# Patient Record
Sex: FEMALE | URBAN - METROPOLITAN AREA
[De-identification: names, ages, dates, MRNs, and addresses within clinical notes are randomized per-mention and may not be internally consistent; named-entity substitution may affect disease eponyms.]

---

## 2022-02-21 ENCOUNTER — HOSPITAL ENCOUNTER (INPATIENT)
Age: 32
LOS: 1 days | Discharge: HOME | End: 2022-02-22
Attending: OBSTETRICS & GYNECOLOGY | Admitting: OBSTETRICS & GYNECOLOGY
Payer: COMMERCIAL

## 2022-02-21 ENCOUNTER — ANESTHESIA EVENT (OUTPATIENT)
Dept: LABOR AND DELIVERY | Age: 32
End: 2022-02-21
Payer: COMMERCIAL

## 2022-02-21 ENCOUNTER — ANESTHESIA (OUTPATIENT)
Dept: LABOR AND DELIVERY | Age: 32
End: 2022-02-21
Payer: COMMERCIAL

## 2022-02-21 PROBLEM — Z34.90 TERM PREGNANCY: Status: ACTIVE | Noted: 2022-02-21

## 2022-02-21 PROBLEM — Z78.9 ADMITTED TO LABOR AND DELIVERY: Status: ACTIVE | Noted: 2022-02-21

## 2022-02-21 PROBLEM — Z78.9 ADMITTED TO LABOR AND DELIVERY: Status: RESOLVED | Noted: 2022-02-21 | Resolved: 2022-02-21

## 2022-02-21 PROBLEM — Z34.90 TERM PREGNANCY: Status: RESOLVED | Noted: 2022-02-21 | Resolved: 2022-02-21

## 2022-02-21 LAB
ABO/RH: NORMAL
ALBUMIN SERPL-MCNC: 3.4 G/DL (ref 3.5–4.6)
ALP BLD-CCNC: 166 U/L (ref 40–130)
ALT SERPL-CCNC: 21 U/L (ref 0–33)
AMPHETAMINE SCREEN, URINE: NORMAL
ANION GAP SERPL CALCULATED.3IONS-SCNC: 16 MEQ/L (ref 9–15)
ANTIBODY SCREEN: NORMAL
AST SERPL-CCNC: 22 U/L (ref 0–35)
BACTERIA: NEGATIVE /HPF
BARBITURATE SCREEN URINE: NORMAL
BASOPHILS ABSOLUTE: 0 K/UL (ref 0–0.2)
BASOPHILS RELATIVE PERCENT: 0.1 %
BENZODIAZEPINE SCREEN, URINE: NORMAL
BILIRUB SERPL-MCNC: 0.3 MG/DL (ref 0.2–0.7)
BILIRUBIN URINE: NEGATIVE
BLOOD, URINE: ABNORMAL
BUN BLDV-MCNC: 8 MG/DL (ref 6–20)
CALCIUM SERPL-MCNC: 9.3 MG/DL (ref 8.5–9.9)
CANNABINOID SCREEN URINE: NORMAL
CHLORIDE BLD-SCNC: 103 MEQ/L (ref 95–107)
CLARITY: ABNORMAL
CO2: 17 MEQ/L (ref 20–31)
COCAINE METABOLITE SCREEN URINE: NORMAL
COLOR: YELLOW
CREAT SERPL-MCNC: 0.6 MG/DL (ref 0.5–0.9)
EOSINOPHILS ABSOLUTE: 0 K/UL (ref 0–0.7)
EOSINOPHILS RELATIVE PERCENT: 0.2 %
EPITHELIAL CELLS, UA: ABNORMAL /HPF (ref 0–5)
GFR AFRICAN AMERICAN: >60
GFR NON-AFRICAN AMERICAN: >60
GLOBULIN: 3.2 G/DL (ref 2.3–3.5)
GLUCOSE BLD-MCNC: 131 MG/DL (ref 70–99)
GLUCOSE URINE: NEGATIVE MG/DL
HCT VFR BLD CALC: 36.1 % (ref 37–47)
HEMOGLOBIN: 12.6 G/DL (ref 12–16)
HEPATITIS B SURFACE ANTIGEN INTERPRETATION: NORMAL
HYALINE CASTS: ABNORMAL /HPF (ref 0–5)
KETONES, URINE: 15 MG/DL
LEUKOCYTE ESTERASE, URINE: NEGATIVE
LYMPHOCYTES ABSOLUTE: 1.9 K/UL (ref 1–4.8)
LYMPHOCYTES RELATIVE PERCENT: 17.3 %
Lab: NORMAL
MCH RBC QN AUTO: 31.3 PG (ref 27–31.3)
MCHC RBC AUTO-ENTMCNC: 34.8 % (ref 33–37)
MCV RBC AUTO: 90.1 FL (ref 82–100)
METHADONE SCREEN, URINE: NORMAL
MONOCYTES ABSOLUTE: 0.6 K/UL (ref 0.2–0.8)
MONOCYTES RELATIVE PERCENT: 5.6 %
NEUTROPHILS ABSOLUTE: 8.4 K/UL (ref 1.4–6.5)
NEUTROPHILS RELATIVE PERCENT: 76.8 %
NITRITE, URINE: NEGATIVE
OPIATE SCREEN URINE: NORMAL
OXYCODONE URINE: NORMAL
PDW BLD-RTO: 13.8 % (ref 11.5–14.5)
PH UA: 7 (ref 5–9)
PHENCYCLIDINE SCREEN URINE: NORMAL
PLATELET # BLD: 226 K/UL (ref 130–400)
POTASSIUM SERPL-SCNC: 3.6 MEQ/L (ref 3.4–4.9)
PROPOXYPHENE SCREEN: NORMAL
PROTEIN UA: NEGATIVE MG/DL
RBC # BLD: 4.01 M/UL (ref 4.2–5.4)
RBC UA: ABNORMAL /HPF (ref 0–5)
RUBELLA ANTIBODY IGG: >500 IU/ML
SARS-COV-2, NAAT: NOT DETECTED
SODIUM BLD-SCNC: 136 MEQ/L (ref 135–144)
SPECIFIC GRAVITY UA: 1.02 (ref 1–1.03)
TOTAL PROTEIN: 6.6 G/DL (ref 6.3–8)
UROBILINOGEN, URINE: 0.2 E.U./DL
WBC # BLD: 11 K/UL (ref 4.8–10.8)
WBC UA: ABNORMAL /HPF (ref 0–5)

## 2022-02-21 PROCEDURE — 87340 HEPATITIS B SURFACE AG IA: CPT

## 2022-02-21 PROCEDURE — 3700000025 EPIDURAL BLOCK: Performed by: NURSE ANESTHETIST, CERTIFIED REGISTERED

## 2022-02-21 PROCEDURE — 2500000003 HC RX 250 WO HCPCS: Performed by: OBSTETRICS & GYNECOLOGY

## 2022-02-21 PROCEDURE — 86850 RBC ANTIBODY SCREEN: CPT

## 2022-02-21 PROCEDURE — 86901 BLOOD TYPING SEROLOGIC RH(D): CPT

## 2022-02-21 PROCEDURE — 86762 RUBELLA ANTIBODY: CPT

## 2022-02-21 PROCEDURE — 80307 DRUG TEST PRSMV CHEM ANLYZR: CPT

## 2022-02-21 PROCEDURE — 1220000000 HC SEMI PRIVATE OB R&B

## 2022-02-21 PROCEDURE — 85025 COMPLETE CBC W/AUTO DIFF WBC: CPT

## 2022-02-21 PROCEDURE — 86592 SYPHILIS TEST NON-TREP QUAL: CPT

## 2022-02-21 PROCEDURE — 6370000000 HC RX 637 (ALT 250 FOR IP): Performed by: OBSTETRICS & GYNECOLOGY

## 2022-02-21 PROCEDURE — 87635 SARS-COV-2 COVID-19 AMP PRB: CPT

## 2022-02-21 PROCEDURE — 6360000002 HC RX W HCPCS: Performed by: NURSE ANESTHETIST, CERTIFIED REGISTERED

## 2022-02-21 PROCEDURE — 2500000003 HC RX 250 WO HCPCS: Performed by: NURSE ANESTHETIST, CERTIFIED REGISTERED

## 2022-02-21 PROCEDURE — 86900 BLOOD TYPING SEROLOGIC ABO: CPT

## 2022-02-21 PROCEDURE — 80053 COMPREHEN METABOLIC PANEL: CPT

## 2022-02-21 PROCEDURE — 2580000003 HC RX 258: Performed by: OBSTETRICS & GYNECOLOGY

## 2022-02-21 PROCEDURE — 6360000002 HC RX W HCPCS: Performed by: OBSTETRICS & GYNECOLOGY

## 2022-02-21 PROCEDURE — 81001 URINALYSIS AUTO W/SCOPE: CPT

## 2022-02-21 RX ORDER — SODIUM CHLORIDE 0.9 % (FLUSH) 0.9 %
5-40 SYRINGE (ML) INJECTION EVERY 12 HOURS SCHEDULED
Status: DISCONTINUED | OUTPATIENT
Start: 2022-02-21 | End: 2022-02-22 | Stop reason: HOSPADM

## 2022-02-21 RX ORDER — ONDANSETRON 2 MG/ML
4 INJECTION INTRAMUSCULAR; INTRAVENOUS EVERY 6 HOURS PRN
Status: DISCONTINUED | OUTPATIENT
Start: 2022-02-21 | End: 2022-02-22 | Stop reason: HOSPADM

## 2022-02-21 RX ORDER — IBUPROFEN 800 MG/1
800 TABLET ORAL EVERY 6 HOURS PRN
Status: DISCONTINUED | OUTPATIENT
Start: 2022-02-21 | End: 2022-02-22 | Stop reason: HOSPADM

## 2022-02-21 RX ORDER — NALBUPHINE HCL 10 MG/ML
10 AMPUL (ML) INJECTION
Status: DISCONTINUED | OUTPATIENT
Start: 2022-02-21 | End: 2022-02-22 | Stop reason: HOSPADM

## 2022-02-21 RX ORDER — SODIUM CHLORIDE, SODIUM LACTATE, POTASSIUM CHLORIDE, CALCIUM CHLORIDE 600; 310; 30; 20 MG/100ML; MG/100ML; MG/100ML; MG/100ML
INJECTION, SOLUTION INTRAVENOUS CONTINUOUS
Status: DISCONTINUED | OUTPATIENT
Start: 2022-02-21 | End: 2022-02-22 | Stop reason: HOSPADM

## 2022-02-21 RX ORDER — SODIUM CHLORIDE 9 MG/ML
25 INJECTION, SOLUTION INTRAVENOUS PRN
Status: DISCONTINUED | OUTPATIENT
Start: 2022-02-21 | End: 2022-02-22 | Stop reason: HOSPADM

## 2022-02-21 RX ORDER — DOCUSATE SODIUM 100 MG/1
100 CAPSULE, LIQUID FILLED ORAL 2 TIMES DAILY
Status: DISCONTINUED | OUTPATIENT
Start: 2022-02-21 | End: 2022-02-22 | Stop reason: HOSPADM

## 2022-02-21 RX ORDER — NALBUPHINE HCL 10 MG/ML
5 AMPUL (ML) INJECTION EVERY 4 HOURS PRN
Status: DISCONTINUED | OUTPATIENT
Start: 2022-02-21 | End: 2022-02-22 | Stop reason: HOSPADM

## 2022-02-21 RX ORDER — IBUPROFEN 800 MG/1
800 TABLET ORAL EVERY 6 HOURS PRN
Qty: 120 TABLET | Refills: 3 | Status: SHIPPED | OUTPATIENT
Start: 2022-02-21

## 2022-02-21 RX ORDER — SODIUM CHLORIDE 0.9 % (FLUSH) 0.9 %
5-40 SYRINGE (ML) INJECTION PRN
Status: DISCONTINUED | OUTPATIENT
Start: 2022-02-21 | End: 2022-02-22 | Stop reason: HOSPADM

## 2022-02-21 RX ORDER — FENTANYL CITRATE 50 UG/ML
INJECTION, SOLUTION INTRAMUSCULAR; INTRAVENOUS PRN
Status: DISCONTINUED | OUTPATIENT
Start: 2022-02-21 | End: 2022-02-21 | Stop reason: SDUPTHER

## 2022-02-21 RX ORDER — NALOXONE HYDROCHLORIDE 0.4 MG/ML
0.4 INJECTION, SOLUTION INTRAMUSCULAR; INTRAVENOUS; SUBCUTANEOUS PRN
Status: DISCONTINUED | OUTPATIENT
Start: 2022-02-21 | End: 2022-02-22 | Stop reason: HOSPADM

## 2022-02-21 RX ORDER — MODIFIED LANOLIN
OINTMENT (GRAM) TOPICAL PRN
Status: DISCONTINUED | OUTPATIENT
Start: 2022-02-21 | End: 2022-02-22 | Stop reason: HOSPADM

## 2022-02-21 RX ORDER — BUPIVACAINE HYDROCHLORIDE 2.5 MG/ML
INJECTION, SOLUTION EPIDURAL; INFILTRATION; INTRACAUDAL PRN
Status: DISCONTINUED | OUTPATIENT
Start: 2022-02-21 | End: 2022-02-21 | Stop reason: SDUPTHER

## 2022-02-21 RX ORDER — ACETAMINOPHEN 325 MG/1
650 TABLET ORAL EVERY 4 HOURS PRN
Status: DISCONTINUED | OUTPATIENT
Start: 2022-02-21 | End: 2022-02-22 | Stop reason: HOSPADM

## 2022-02-21 RX ADMIN — IBUPROFEN 800 MG: 800 TABLET, FILM COATED ORAL at 20:28

## 2022-02-21 RX ADMIN — SODIUM CHLORIDE, POTASSIUM CHLORIDE, SODIUM LACTATE AND CALCIUM CHLORIDE: 600; 310; 30; 20 INJECTION, SOLUTION INTRAVENOUS at 10:33

## 2022-02-21 RX ADMIN — SODIUM CHLORIDE, POTASSIUM CHLORIDE, SODIUM LACTATE AND CALCIUM CHLORIDE: 600; 310; 30; 20 INJECTION, SOLUTION INTRAVENOUS at 08:02

## 2022-02-21 RX ADMIN — Medication 12 ML/HR: at 08:20

## 2022-02-21 RX ADMIN — Medication 3 ML: at 08:15

## 2022-02-21 RX ADMIN — DOCUSATE SODIUM 100 MG: 100 CAPSULE ORAL at 20:28

## 2022-02-21 RX ADMIN — NALBUPHINE HYDROCHLORIDE 10 MG: 10 INJECTION, SOLUTION INTRAMUSCULAR; INTRAVENOUS; SUBCUTANEOUS at 05:52

## 2022-02-21 RX ADMIN — FENTANYL CITRATE 100 MCG: 50 INJECTION, SOLUTION INTRAMUSCULAR; INTRAVENOUS at 09:45

## 2022-02-21 RX ADMIN — SODIUM CHLORIDE, POTASSIUM CHLORIDE, SODIUM LACTATE AND CALCIUM CHLORIDE: 600; 310; 30; 20 INJECTION, SOLUTION INTRAVENOUS at 06:33

## 2022-02-21 RX ADMIN — BUPIVACAINE HYDROCHLORIDE 3 ML: 2.5 INJECTION, SOLUTION EPIDURAL; INFILTRATION; INTRACAUDAL; PERINEURAL at 09:45

## 2022-02-21 ASSESSMENT — PAIN SCALES - GENERAL
PAINLEVEL_OUTOF10: 8
PAINLEVEL_OUTOF10: 8
PAINLEVEL_OUTOF10: 1

## 2022-02-21 NOTE — FLOWSHEET NOTE
0800 Sitting for Epidural Annemarie CRNA at bedside. 0810 Test dose given  0816 Epidural complete pt lying down with l sided wedge  0820 Epidural to pump rate set at 12.

## 2022-02-21 NOTE — FLOWSHEET NOTE
Epidural capped pt up to bathroom gisselle care done unable to void. IV converted to hep lock fluids infused.

## 2022-02-21 NOTE — L&D DELIVERY NOTE
Department of Obstetrics and Gynecology  Spontaneous Vaginal Delivery Note      Pre-operative Diagnosis:  Term pregnancy    Post-operative Diagnosis:  Living  infant(s)    Information for the patient's :  Anaya Benson [[id number]]                    Infant Wt:   Information for the patient's :  Anaya Benson [[id number]]           APGARS:   8/9  Information for the patient's :  Anaya Benson [[id number]]           Anesthesia:  epidural anesthesia    Application and Delivery:  none    Delivery Summary:EBL 18cc  NCVD of a female infant. Oral and nasal orifice suctioned. Remainder of the delivery was unremarkable. Cord was clamped and cut. The placenta was manually removed. Perineum was intact. Mother and baby bonded well. Specimen:  Placenta sent to pathology     Intake/Output:     Date 22 07 - 22 0700(Not Admitted) 22 0701 - 22 0700   Shift 9934-1319 7312-8933 24 Hour Total 1371-1092 0730-1372 24 Hour Total   INTAKE   I.V.    1484.9  1484.9   Shift Total    1484.9  1484.9   OUTPUT   Blood    200  200     Quantitative Blood Loss (mL)    200  200   Shift Total    200  200   NET    1284.9  1284.9       Condition:  infant stable to general nursery    Blood Type and Rh: A POS        Rubella Immunity Status:   Immune           Infant Feeding:    breast    Attending Attestation: I was present and scrubbed for the entire procedure.

## 2022-02-21 NOTE — PLAN OF CARE
Problem: Pain:  Goal: Pain level will decrease  Description: Pain level will decrease  2/21/2022 1107 by Jerman Sheikh RN  Outcome: Met This Shift  2/21/2022 0740 by Jerman Sheikh RN  Outcome: Ongoing  Goal: Control of acute pain  Description: Control of acute pain  2/21/2022 1107 by Jerman Sheikh RN  Outcome: Met This Shift  2/21/2022 0740 by Jerman Sheikh RN  Outcome: Ongoing  Goal: Control of chronic pain  Description: Control of chronic pain  2/21/2022 1107 by Jerman Sheikh RN  Outcome: Met This Shift  2/21/2022 0740 by Jerman Sheikh RN  Outcome: Ongoing     Problem: Anxiety:  Goal: Level of anxiety will decrease  Description: Level of anxiety will decrease  2/21/2022 1107 by Jerman Sheikh RN  Outcome: Met This Shift  2/21/2022 0740 by Jerman Sheikh RN  Outcome: Ongoing     Problem: Breathing Pattern - Ineffective:  Goal: Able to breathe comfortably  Description: Able to breathe comfortably  2/21/2022 1107 by Jreman Sheikh RN  Outcome: Met This Shift  2/21/2022 0740 by Jerman Sheikh RN  Outcome: Ongoing     Problem: Fluid Volume - Imbalance:  Goal: Absence of imbalanced fluid volume signs and symptoms  Description: Absence of imbalanced fluid volume signs and symptoms  2/21/2022 1107 by Jerman Sheikh RN  Outcome: Met This Shift  2/21/2022 0740 by Jerman Sheikh RN  Outcome: Ongoing  Goal: Absence of intrapartum hemorrhage signs and symptoms  Description: Absence of intrapartum hemorrhage signs and symptoms  2/21/2022 1107 by Jerman Sheikh RN  Outcome: Met This Shift  2/21/2022 0740 by Jerman Sheikh RN  Outcome: Ongoing     Problem: Infection - Intrapartum Infection:  Goal: Will show no infection signs and symptoms  Description: Will show no infection signs and symptoms  2/21/2022 1107 by Jerman Sheikh RN  Outcome: Met This Shift  2/21/2022 0740 by Jerman Sheikh RN  Outcome: Ongoing     Problem: Labor Process - Prolonged:  Goal: Labor progression, first stage, within specified pattern  Description: Labor progression, first stage, within specified pattern  2022 1107 by Ben Brumfield RN  Outcome: Met This Shift  2022 0740 by Ben Brumfield RN  Outcome: Ongoing  Goal: Labor progession, second stage, within specified pattern  Description: Labor progession, second stage, within specified pattern  2022 1107 by Ben Brumfield RN  Outcome: Met This Shift  2022 0740 by Ben Brumfield RN  Outcome: Ongoing  Goal: Uterine contractions within specified parameters  Description: Uterine contractions within specified parameters  2022 1107 by Ben Brumfield RN  Outcome: Met This Shift  2022 0740 by Ben Brumfield RN  Outcome: Ongoing     Problem:  Screening:  Goal: Ability to make informed decisions regarding treatment has improved  Description: Ability to make informed decisions regarding treatment has improved  2022 1107 by Ben Brumfield RN  Outcome: Met This Shift  2022 0740 by Ben Brumfield RN  Outcome: Ongoing     Problem: Pain - Acute:  Goal: Pain level will decrease  Description: Pain level will decrease  2022 1107 by Ben Brumfield RN  Outcome: Met This Shift  2022 0740 by Ben Brumfield RN  Outcome: Ongoing  Goal: Able to cope with pain  Description: Able to cope with pain  2022 1107 by Ben Brumfield RN  Outcome: Met This Shift  2022 0740 by Ben Brumfield RN  Outcome: Ongoing     Problem: Tissue Perfusion - Uteroplacental, Altered:  Goal: Absence of abnormal fetal heart rate pattern  Description: Absence of abnormal fetal heart rate pattern  2022 1107 by Ben Brumfield RN  Outcome: Met This Shift  2022 0740 by Ben Brumfield RN  Outcome: Ongoing     Problem: Urinary Retention:  Goal: Experiences of bladder distention will decrease  Description: Experiences of bladder distention will decrease  2022 1107 by Ben Brumfield RN  Outcome: Met This Shift  2/21/2022 0740 by Gera Peralta RN  Outcome: Ongoing  Goal: Urinary elimination within specified parameters  Description: Urinary elimination within specified parameters  2/21/2022 1107 by Gera Peralta RN  Outcome: Met This Shift  2/21/2022 0740 by Gera Peralta RN  Outcome: Ongoing

## 2022-02-21 NOTE — FLOWSHEET NOTE
Received call from Dr Ardie Snellen MD in route pt aware.  Resting comfortably awaiting MD's arrival.

## 2022-02-21 NOTE — PLAN OF CARE
Problem: Pain:  Goal: Pain level will decrease  Description: Pain level will decrease  2/21/2022 1107 by Mami Menjivar RN  Outcome: Ongoing  2/21/2022 1107 by Mami Menjivar RN  Outcome: Met This Shift  2/21/2022 0740 by Mami Menjivar RN  Outcome: Ongoing  Goal: Control of acute pain  Description: Control of acute pain  2/21/2022 1107 by Mami Menjivar, RN  Outcome: Ongoing  2/21/2022 1107 by Mami Menjivar RN  Outcome: Met This Shift  2/21/2022 0740 by Mami Menjivar RN  Outcome: Ongoing  Goal: Control of chronic pain  Description: Control of chronic pain  2/21/2022 1107 by Mami Menjivar, RN  Outcome: Ongoing  2/21/2022 1107 by Mami Menjivar RN  Outcome: Met This Shift  2/21/2022 0740 by Mami Menjivar RN  Outcome: Ongoing     Problem: Anxiety:  Goal: Level of anxiety will decrease  Description: Level of anxiety will decrease  2/21/2022 1107 by Mami Menjivar, RN  Outcome: Ongoing  2/21/2022 1107 by Mami Menjivar, RN  Outcome: Met This Shift  2/21/2022 0740 by Mami Menjivar RN  Outcome: Ongoing     Problem: Breathing Pattern - Ineffective:  Goal: Able to breathe comfortably  Description: Able to breathe comfortably  2/21/2022 1107 by Mami Menjivar RN  Outcome: Ongoing  2/21/2022 1107 by Mami Menjivar RN  Outcome: Met This Shift  2/21/2022 0740 by Mami Menjivar RN  Outcome: Ongoing     Problem: Fluid Volume - Imbalance:  Goal: Absence of imbalanced fluid volume signs and symptoms  Description: Absence of imbalanced fluid volume signs and symptoms  2/21/2022 1107 by Mami Menjivar RN  Outcome: Ongoing  2/21/2022 1107 by Mami Menjivar, RN  Outcome: Met This Shift  2/21/2022 0740 by Mami Menjivar RN  Outcome: Ongoing  Goal: Absence of intrapartum hemorrhage signs and symptoms  Description: Absence of intrapartum hemorrhage signs and symptoms  2/21/2022 1107 by Mami Menjivar RN  Outcome: Ongoing  2/21/2022 1107 by Mami Menjivar RN  Outcome: Met This Shift  2022 0740 by Ean Zapata RN  Outcome: Ongoing  Goal: Absence of postpartum hemorrhage signs and symptoms  Description: Absence of postpartum hemorrhage signs and symptoms  Outcome: Ongoing     Problem: Infection - Intrapartum Infection:  Goal: Will show no infection signs and symptoms  Description: Will show no infection signs and symptoms  2022 110 by Ean Zapata RN  Outcome: Ongoing  2022 110 by Ean Zapata RN  Outcome: Met This Shift  2022 0740 by Ean Zapata RN  Outcome: Ongoing     Problem: Labor Process - Prolonged:  Goal: Labor progression, first stage, within specified pattern  Description: Labor progression, first stage, within specified pattern  2022 110 by Ean Zapata RN  Outcome: Ongoing  2022 110 by Ean Zapata RN  Outcome: Met This Shift  2022 0740 by Ean Zapata RN  Outcome: Ongoing  Goal: Labor progession, second stage, within specified pattern  Description: Labor progession, second stage, within specified pattern  2022 110 by Ean Zapata RN  Outcome: Ongoing  2022 110 by Ean Zapata RN  Outcome: Met This Shift  2022 0740 by Ean Zapata RN  Outcome: Ongoing  Goal: Uterine contractions within specified parameters  Description: Uterine contractions within specified parameters  2022 by Ean Zapata RN  Outcome: Ongoing  2022 1107 by Ean Zapata RN  Outcome: Met This Shift  2022 0740 by Ean Zapata RN  Outcome: Ongoing     Problem:  Screening:  Goal: Ability to make informed decisions regarding treatment has improved  Description: Ability to make informed decisions regarding treatment has improved  2022 1107 by Ean Zapata RN  Outcome: Ongoing  2022 110 by Ean Zapata RN  Outcome: Met This Shift  2022 0740 by Ean Zapata RN  Outcome: Ongoing     Problem: Pain - Acute:  Goal: Pain level will decrease  Description: Pain level will decrease  2/21/2022 1107 by Pura Harvey RN  Outcome: Ongoing  2/21/2022 1107 by Pura Harvey RN  Outcome: Met This Shift  2/21/2022 0740 by Pura Harvey RN  Outcome: Ongoing  Goal: Able to cope with pain  Description: Able to cope with pain  2/21/2022 1107 by Pura Harvey RN  Outcome: Ongoing  2/21/2022 1107 by Pura Harvey RN  Outcome: Met This Shift  2/21/2022 0740 by Pura Harvey RN  Outcome: Ongoing     Problem: Tissue Perfusion - Uteroplacental, Altered:  Goal: Absence of abnormal fetal heart rate pattern  Description: Absence of abnormal fetal heart rate pattern  2/21/2022 1107 by Pura Harvey RN  Outcome: Ongoing  2/21/2022 1107 by Pura Harvey RN  Outcome: Met This Shift  2/21/2022 0740 by Pura Harvey RN  Outcome: Ongoing     Problem: Urinary Retention:  Goal: Experiences of bladder distention will decrease  Description: Experiences of bladder distention will decrease  2/21/2022 1107 by Pura Harvey RN  Outcome: Ongoing  2/21/2022 1107 by Pura Harvey RN  Outcome: Met This Shift  2/21/2022 0740 by Pura Harvey RN  Outcome: Ongoing  Goal: Urinary elimination within specified parameters  Description: Urinary elimination within specified parameters  2/21/2022 1107 by Pura Harvey RN  Outcome: Ongoing  2/21/2022 1107 by Pura Harvey RN  Outcome: Met This Shift  2/21/2022 0740 by Pura Harvey RN  Outcome: Ongoing     Problem: Discharge Planning:  Goal: Discharged to appropriate level of care  Description: Discharged to appropriate level of care  Outcome: Ongoing     Problem: Constipation:  Goal: Bowel elimination is within specified parameters  Description: Bowel elimination is within specified parameters  Outcome: Ongoing     Problem: Infection - Risk of, Puerperal Infection:  Goal: Will show no infection signs and symptoms  Description: Will show no infection signs and symptoms  2/21/2022 1107 by Gary Mejia RN  Outcome: Ongoing  2/21/2022 1107 by Gary Mejia RN  Outcome: Met This Shift  2/21/2022 0740 by Gary Mejia RN  Outcome: Ongoing     Problem: Mood - Altered:  Goal: Mood stable  Description: Mood stable  Outcome: Ongoing

## 2022-02-21 NOTE — LACTATION NOTE
This note was copied from a baby's chart.  -initial lactation visit  -mom has prior breastfeeding experience x 1 year  -states she has breastpump for home use  -baby skin to skin with mom, noted to be mucousy and spitting small amounts of amniotic fluid (precip vag delivery)  -assisted in latching baby to left breast in cradle hold  -on/off latch noted with a few short bursts of rhythmic sucking  -educated on normal infant feeding patterns, anticipated output and weight monitoring  -provided reassurance and encouragement  -advised to call for next feed  -will revisit    1225-follow up  -mom reports breastfeeding going \"ok\"  -states baby fed off and on for approx 1 hr post delivery (did not request [facility] assistance)  -on cell phone at time of visit  -encouraged to call for next feed    1510-follow up  -mom states baby has been sleeping since last feed  -recommend placing baby skin to skin and observing for feeding cues  -mom verbalized understanding  -denies questions/concerns re: breastfeeding at this time

## 2022-02-21 NOTE — FLOWSHEET NOTE
Iraheta inserted chux with moderate amount of clear fluid. SVE pt 9cms. Call placed to Dr Ardie Snellen. On his way.

## 2022-02-21 NOTE — FLOWSHEET NOTE
Call to Dr. Mario Smith. Informed of pts cervical change. Admit labor orders received.  Stated okay for pt to have epidural.

## 2022-02-21 NOTE — ANESTHESIA PRE PROCEDURE
Department of Anesthesiology  Preprocedure Note       Name:  Shruthi Vieyra   Age:  28 y.o.  :  1990                                          MRN:  [id number]         Date:  2022      Surgeon: * No surgeons listed *    Procedure: * No procedures listed *    Medications prior to admission:   Prior to Admission medications    Not on File       Current medications:    Current Facility-Administered Medications   Medication Dose Route Frequency Provider Last Rate Last Admin    nalbuphine (NUBAIN) injection 10 mg  10 mg IntraVENous Q3H PRN Carter Cruz MD   10 mg at 22 6382    lactated ringers infusion   IntraVENous Continuous Carter Cruz  mL/hr at 22 6736 New Bag at 22 8224       Allergies:  No Known Allergies    Problem List:  There is no problem list on file for this patient. Past Medical History:  History reviewed. No pertinent past medical history. Past Surgical History:        Procedure Laterality Date     SECTION      WISDOM TOOTH EXTRACTION         Social History:    Social History     Tobacco Use    Smoking status: Never Smoker    Smokeless tobacco: Never Used   Substance Use Topics    Alcohol use: Not on file                                Counseling given: Not Answered      Vital Signs (Current):   Vitals:    22 0504 22 0505 22 0536   BP: 125/74 (!) 143/75    Pulse: 85 78    Resp: 18     Temp: 36.9 °C (98.5 °F)     TempSrc: Oral     SpO2: 99%     Weight:   225 lb (102.1 kg)   Height:   5' 3\" (1.6 m)                                              BP Readings from Last 3 Encounters:   22 (!) 143/75       NPO Status:                                                                                 BMI:   Wt Readings from Last 3 Encounters:   22 225 lb (102.1 kg)     Body mass index is 39.86 kg/m².     CBC:   Lab Results   Component Value Date    WBC 11.0 2022    RBC 4.01 2022    HGB 12.6 2022    HCT 36.1 02/21/2022    MCV 90.1 02/21/2022    RDW 13.8 02/21/2022     02/21/2022       CMP:   Lab Results   Component Value Date     02/21/2022    K 3.6 02/21/2022     02/21/2022    CO2 17 02/21/2022    BUN 8 02/21/2022    CREATININE 0.60 02/21/2022    GFRAA >60.0 02/21/2022    LABGLOM >60.0 02/21/2022    GLUCOSE 131 02/21/2022    PROT 6.6 02/21/2022    CALCIUM 9.3 02/21/2022    BILITOT 0.3 02/21/2022    ALKPHOS 166 02/21/2022    AST 22 02/21/2022    ALT 21 02/21/2022       POC Tests: No results for input(s): POCGLU, POCNA, POCK, POCCL, POCBUN, POCHEMO, POCHCT in the last 72 hours. Coags: No results found for: PROTIME, INR, APTT    HCG (If Applicable): No results found for: PREGTESTUR, PREGSERUM, HCG, HCGQUANT     ABGs: No results found for: PHART, PO2ART, TXB0ZFM, DEE1JZM, BEART, R9GSWHDJ     Type & Screen (If Applicable):  No results found for: LABABO, LABRH    Drug/Infectious Status (If Applicable):  No results found for: HIV, HEPCAB    COVID-19 Screening (If Applicable):   Lab Results   Component Value Date    COVID19 Not Detected 02/21/2022           Anesthesia Evaluation  Patient summary reviewed and Nursing notes reviewed  Airway: Mallampati: II  TM distance: >3 FB   Neck ROM: full  Mouth opening: > = 3 FB Dental:          Pulmonary:                              Cardiovascular:                      Neuro/Psych:               GI/Hepatic/Renal:             Endo/Other:                     Abdominal:             Vascular: Other Findings:             Anesthesia Plan      epidural     ASA 2             Anesthetic plan and risks discussed with patient. Use of blood products discussed with patient whom consented to blood products.                    GONZALO Valle - SOUMYA   2/21/2022

## 2022-02-21 NOTE — FLOWSHEET NOTE
Received report at bedside pt uncomfortable fluids increasing as bolus. Prefers to wait until comfortable for assessment of cervix. No fluid noted on cgux.

## 2022-02-21 NOTE — ANESTHESIA PROCEDURE NOTES
Epidural Block    Patient location during procedure: OB  Start time: 2/21/2022 8:05 AM  End time: 2/21/2022 8:11 AM  Reason for block: labor epidural  Staffing  Performed: resident/CRNA   Resident/CRNA: GONZALO Aguayo CRNA  Preanesthetic Checklist  Completed: patient identified, IV checked, risks and benefits discussed, monitors and equipment checked, pre-op evaluation, timeout performed, anesthesia consent given, oxygen available and patient being monitored  Epidural  Patient position: sitting  Prep: ChloraPrep and site prepped and draped  Patient monitoring: frequent blood pressure checks and continuous pulse ox  Approach: midline  Location: lumbar (1-5)  Injection technique: VINOD saline  Provider prep: sterile gloves and mask  Needle  Needle type: Tuohy   Needle gauge: 17 G  Needle length: 3.5 in  Needle insertion depth: 7.5 cm  Catheter type: end hole  Catheter size: 19 G  Catheter at skin depth: 14 cm  Test dose: negative (@0810 4 ml 1.5% lido w 1:200 k epi)  Assessment  Sensory level: T6  Hemodynamics: stable  Attempts: 1  Additional Notes  Negative heme, negative CSF to catheter aspiration.  Negative parasthesia

## 2022-02-21 NOTE — ANESTHESIA POSTPROCEDURE EVALUATION
Department of Anesthesiology  Postprocedure Note    Patient: Baldo Langford  MRN: [id number]  YOB: 1990  Date of evaluation: 2/21/2022  Time:  2:59 PM     Procedure Summary     Date: 02/21/22 Room / Location:     Anesthesia Start: 0755 Anesthesia Stop: 3582    Procedure: Labor Analgesia Diagnosis:     Scheduled Providers:  Responsible Provider: GONZALO Sheth CRNA    Anesthesia Type: epidural ASA Status: 2          Anesthesia Type: epidural    Sharon Phase I: Sharon Score: 10    Sharon Phase II:      Last vitals: Reviewed and per EMR flowsheets.        Anesthesia Post Evaluation    Patient location during evaluation: bedside  Patient participation: complete - patient participated  Level of consciousness: awake and alert  Pain score: 0  Airway patency: patent  Nausea & Vomiting: no nausea and no vomiting  Complications: no  Cardiovascular status: blood pressure returned to baseline and hemodynamically stable  Respiratory status: acceptable, spontaneous ventilation, nonlabored ventilation and room air  Hydration status: euvolemic

## 2022-02-21 NOTE — FLOWSHEET NOTE
Type and Screen verification drawn and sent to lab. Received call from Dr Kathrin Arnold made aware of current pt status will call MD after next evaluation with and update.

## 2022-02-21 NOTE — H&P
Department of Obstetrics and Gynecology  Attending Obstetrics History and Physical        CHIEF COMPLAINT:  contractions    HISTORY OF PRESENT ILLNESS:      The patient is a 28 y.o.  3 parity 2 at 42 weeks. Patient presents with a chief complaint as above and is being admitted for latent labor    DATES:    Last Menstrual Period:    Estimated Due Date:      PRENATAL CARE:    Provider:  vannesa    Blood Type/Rh:    Antibody Screen:    Rubella:    RPR:    Hepatitis B Surface Antigen:   HIV:    Gonorrhea:    Chlamydia:    MSAFP/Multiple Markers:  Date:  ; Results:    U/S Structural Survery:    1 hour Glucose Tolerance Test:    Group B Strep:        PAST OB HISTORY        Depression:  No      Post-partum depression:  No      Diabetes:  No      Gestational Diabetes:  No      Thyroid Disease:  No      Chronic HTN:  No      Gestation HTN:  No      Pre-eclampsia:  No      Seizure disorder:  No      Asthma:  No      Clotting disorder:  No      :  No      Tubal ligation:  No      D & C:  No      Cerclage:  No      LEEP:  No      Myomectomy:  No    OB History    Para Term  AB Living   3 2 1 1   2   SAB IAB Ectopic Molar Multiple Live Births                    # Outcome Date GA Lbr Denny/2nd Weight Sex Delivery Anes PTL Lv   3 Current            2  19 34w0d   M       1 Term 09    F Vag-Breech        Past Gynecological History:      Menarche:  13  Last menstrual period:  No LMP recorded. Patient is pregnant. History of uterine fibroids:  No  History of endometriosis:  No  Pap History:  Last PAP was normal; 2021. Sexually transmitted disease history: none    Past Medical History:    History reviewed. No pertinent past medical history. Past Surgical History:        Procedure Laterality Date     SECTION      WISDOM TOOTH EXTRACTION       Social History:      Family History:   History reviewed. No pertinent family history.   Medications Prior to Admission:  No medications prior to admission. Allergies:  Patient has no known allergies. REVIEW OF SYSTEMS:    Patient has no history of depression. Patient has no symptoms of depression  CONSTITUTIONAL:  negative    PHYSICAL EXAM:    General appearance:  awake, alert, cooperative, no apparent distress, and appears stated age  Neurologic:  Awake, alert, oriented to name, place and time. Cranial nerves II-XII are grossly intact. Motor is 5 out of 5 bilaterally. Cerebellar finger to nose, heel to shin intact. Sensory is intact. Babinski down going, Romberg negative, and gait is normal.  Lungs:  No increased work of breathing, good air exchange, clear to auscultation bilaterally, no crackles or wheezing  Heart:  Normal apical impulse, regular rate and rhythm, normal S1 and S2, no S3 or S4, and no murmur noted  Abdomen:  No scars, normal bowel sounds, soft, non-distended, non-tender, no masses palpated, no hepatosplenomegally  Fetal heart rate:  Baseline Heart Rate 140, accelerations:  present  Pelvis:  External Genitalia: General appearance; normal, Hair distribution; normal, Lesions absent  Cervix:    DILATION:  3 cm  EFFACEMENT:   75%  STATION:  -3 cm  CONSISTENCY:  medium  POSITION:  mid  BISHOPS SCORE:      Contraction frequency:  6 minutes  Membranes:  Ruptured clear fluid    Pelvic Ultrasound:      General Labs:  VDRL:  No components found for: CVDRL    ASSESSMENT AND PLAN:    The patient is a 28 y.o.  3 parity 2 at 40 weeks    Principal Problem:    Admitted to labor and delivery  Plan:    Active Problems:    Term pregnancy  Plan:

## 2022-02-21 NOTE — PROGRESS NOTES
Griffin Antis placed call to Dr Brendolyn Saint. States at this time we will admit the patient for observation.

## 2022-02-21 NOTE — PROGRESS NOTES
Pt up to unit via wheelchair with . States SROM at 2/21 0300 with clear fluid. No bleeding. Complains of contractions rating the pain 8/10. EFM monitors applied. Pt states she does not feel that she can give a urine sample at this time. Will collect urine at a later time.

## 2022-02-22 VITALS
WEIGHT: 225 LBS | SYSTOLIC BLOOD PRESSURE: 106 MMHG | HEART RATE: 92 BPM | TEMPERATURE: 98.1 F | DIASTOLIC BLOOD PRESSURE: 70 MMHG | RESPIRATION RATE: 16 BRPM | BODY MASS INDEX: 39.87 KG/M2 | HEIGHT: 63 IN | OXYGEN SATURATION: 96 %

## 2022-02-22 LAB — RPR: NORMAL

## 2022-02-22 PROCEDURE — 6370000000 HC RX 637 (ALT 250 FOR IP): Performed by: OBSTETRICS & GYNECOLOGY

## 2022-02-22 PROCEDURE — 10D17Z9 MANUAL EXTRACTION OF PRODUCTS OF CONCEPTION, RETAINED, VIA NATURAL OR ARTIFICIAL OPENING: ICD-10-PCS | Performed by: OBSTETRICS & GYNECOLOGY

## 2022-02-22 PROCEDURE — 7200000001 HC VAGINAL DELIVERY

## 2022-02-22 RX ADMIN — IBUPROFEN 800 MG: 800 TABLET, FILM COATED ORAL at 09:53

## 2022-02-22 RX ADMIN — DOCUSATE SODIUM 100 MG: 100 CAPSULE ORAL at 09:53

## 2022-02-22 ASSESSMENT — PAIN SCALES - GENERAL: PAINLEVEL_OUTOF10: 2

## 2022-02-22 NOTE — PLAN OF CARE
Problem: Pain:  Description: Pain management should include both nonpharmacologic and pharmacologic interventions. Goal: Pain level will decrease  Description: Pain level will decrease  2/21/2022 2010 by Jena Shi RN  Outcome: Ongoing  2/21/2022 1107 by Gera Peralta RN  Outcome: Ongoing  2/21/2022 1107 by Gera Peralta RN  Outcome: Met This Shift  2/21/2022 0740 by Gera Peralta RN  Outcome: Ongoing  Goal: Control of acute pain  Description: Control of acute pain  2/21/2022 2010 by Jena Shi RN  Outcome: Met This Shift  2/21/2022 1107 by Gera Peralta RN  Outcome: Ongoing  2/21/2022 1107 by Gera Peralta RN  Outcome: Met This Shift  2/21/2022 0740 by Gera Peralta RN  Outcome: Ongoing  Goal: Control of chronic pain  Description: Control of chronic pain  2/21/2022 2010 by Jena Shi RN  Outcome: Met This Shift  2/21/2022 1107 by Gera Peralta RN  Outcome: Ongoing  2/21/2022 1107 by Gera Peralta RN  Outcome: Met This Shift  2/21/2022 0740 by Gera Peralta RN  Outcome: Ongoing     Problem: Pain:  Description: Pain management should include both nonpharmacologic and pharmacologic interventions. Goal: Control of acute pain  Description: Control of acute pain  2/21/2022 2010 by Jena Shi RN  Outcome: Met This Shift  2/21/2022 1107 by Gera Peralta RN  Outcome: Ongoing  2/21/2022 1107 by Gera Peralta RN  Outcome: Met This Shift  2/21/2022 0740 by Gera Peralta RN  Outcome: Ongoing     Problem: Pain:  Description: Pain management should include both nonpharmacologic and pharmacologic interventions.   Goal: Control of chronic pain  Description: Control of chronic pain  2/21/2022 2010 by Jena Shi RN  Outcome: Met This Shift  2/21/2022 1107 by Gera Peralta RN  Outcome: Ongoing  2/21/2022 1107 by Gera Peralta RN  Outcome: Met This Shift  2/21/2022 0740 by Gera Peralta RN  Outcome: Ongoing     Problem: Anxiety:  Goal: Level of anxiety will decrease  Description: Level of anxiety will decrease  2/21/2022 2010 by Sola Storm RN  Outcome: Met This Shift  2/21/2022 1107 by Jerman Sheikh RN  Outcome: Ongoing  2/21/2022 1107 by Jerman Sheikh RN  Outcome: Met This Shift  2/21/2022 0740 by Jerman Sheikh RN  Outcome: Ongoing

## 2022-02-22 NOTE — LACTATION NOTE
This note was copied from a baby's chart.  -mom breastfeeding going ok, states baby was clusterfeeding overnight  -c/o sore nipples  -breast assessment reveals bilateral redness ti tips of nipple bit nor notable cracks, creases or open areas  -encouraged to call for next feed and work on deeper latch  -mom verbalized understanding of all teaching    0940-follow up at request of mother  -baby actively showing feeding cues upon entering room  -offered assistance with latch on right as mom reports it is more comfortable  -baby latched deeply to right breast in cradle hold  -showed mom how to lean back slightly in order to deepen latch  -mom c/o pain with initial latch and first few sucks, as baby settles into rhythmic sucking pattern, mom reports increased comfort  -rhythmic sucking and audible swallows noted, lips flanged and no part of areola visible during feed  -suspect shallow latch overnight  -encouraged mom to rest when baby sleeps during the day and focus on optimizing latch  -provided with hydrogel dressings, instructed in use  -encouraged to hand express colostrum for use as nipple treatment as well  -planned discharge today  -baby's weight and output are both WNL  -advised to see peds in 2-3 days, call warmline with question/concerns or for outpatient [facility] visit as needed  -mom and dad both verbalize understanding of all teaching    1150-follow up  -pt preparing for discharge  -states last feed was much more comfortable  -offered support and encouragement  -will call warmline as needed

## 2022-02-22 NOTE — PLAN OF CARE
Problem: Pain:  Goal: Pain level will decrease  Description: Pain level will decrease  2/22/2022 1047 by Sayda Burnette RN  Outcome: Completed  2/21/2022 2253 by Danae Ma RN  Outcome: Ongoing  Goal: Control of acute pain  Description: Control of acute pain  2/22/2022 1047 by Sayda Burnette RN  Outcome: Completed  2/21/2022 2253 by Danae Ma RN  Outcome: Ongoing  Goal: Control of chronic pain  Description: Control of chronic pain  2/22/2022 1047 by Sayda Burnette RN  Outcome: Completed  2/21/2022 2253 by Danae Ma RN  Outcome: Ongoing     Problem: Anxiety:  Goal: Level of anxiety will decrease  Description: Level of anxiety will decrease  2/22/2022 1047 by Sayda Burnette RN  Outcome: Completed  2/21/2022 2253 by Danae Ma RN  Outcome: Ongoing     Problem: Breathing Pattern - Ineffective:  Goal: Able to breathe comfortably  Description: Able to breathe comfortably  2/22/2022 1047 by Sayda Burnette RN  Outcome: Completed  2/21/2022 2253 by Danae Ma RN  Outcome: Ongoing     Problem: Fluid Volume - Imbalance:  Goal: Absence of imbalanced fluid volume signs and symptoms  Description: Absence of imbalanced fluid volume signs and symptoms  2/22/2022 1047 by Sayda Burnette RN  Outcome: Completed  2/21/2022 2253 by Danae Ma RN  Outcome: Ongoing  Goal: Absence of intrapartum hemorrhage signs and symptoms  Description: Absence of intrapartum hemorrhage signs and symptoms  2/22/2022 1047 by Sayda Burnette RN  Outcome: Completed  2/21/2022 2253 by Danae Ma RN  Outcome: Ongoing  Goal: Absence of postpartum hemorrhage signs and symptoms  Description: Absence of postpartum hemorrhage signs and symptoms  2/22/2022 1047 by Sayda Burnette RN  Outcome: Completed  2/21/2022 2253 by Danae Ma RN  Outcome: Ongoing     Problem: Infection - Intrapartum Infection:  Goal: Will show no infection signs and symptoms  Description: Will show no infection signs and symptoms  2/22/2022 1047 by Na Remy RN  Outcome: Completed  2022 by Jaret Briseno RN  Outcome: Ongoing     Problem: Labor Process - Prolonged:  Goal: Labor progression, first stage, within specified pattern  Description: Labor progression, first stage, within specified pattern  2022 1047 by Na Remy RN  Outcome: Completed  2022 by Jaret Briseno RN  Outcome: Ongoing  Goal: Labor progession, second stage, within specified pattern  Description: Labor progession, second stage, within specified pattern  2022 1047 by Na Remy RN  Outcome: Completed  2022 by Jaret Briseno RN  Outcome: Ongoing  Goal: Uterine contractions within specified parameters  Description: Uterine contractions within specified parameters  2022 1047 by Na Remy RN  Outcome: Completed  2022 by Jaret Briseno RN  Outcome: Ongoing     Problem:  Screening:  Goal: Ability to make informed decisions regarding treatment has improved  Description: Ability to make informed decisions regarding treatment has improved  2022 1047 by Na Remy RN  Outcome: Completed  2022 by Jaret Briseno RN  Outcome: Ongoing     Problem: Pain - Acute:  Goal: Pain level will decrease  Description: Pain level will decrease  2022 1047 by Na Remy RN  Outcome: Completed  2022 by Jaret Briseno RN  Outcome: Ongoing  Goal: Able to cope with pain  Description: Able to cope with pain  2022 1047 by Na Remy RN  Outcome: Completed  2022 by Jaret Briseno RN  Outcome: Ongoing     Problem: Tissue Perfusion - Uteroplacental, Altered:  Goal: Absence of abnormal fetal heart rate pattern  Description: Absence of abnormal fetal heart rate pattern  2022 1047 by Na Remy RN  Outcome: Completed  2022 by Jaret Briseno RN  Outcome: Ongoing     Problem: Urinary Retention:  Goal: Experiences of bladder distention will decrease  Description: Experiences of bladder distention will decrease  2/22/2022 1047 by Hanane Ochoa RN  Outcome: Completed  2/21/2022 2253 by Rianna Mota RN  Outcome: Ongoing  Goal: Urinary elimination within specified parameters  Description: Urinary elimination within specified parameters  2/22/2022 1047 by Hanane Ochoa RN  Outcome: Completed  2/21/2022 2253 by Rianna Mota RN  Outcome: Ongoing     Problem: Discharge Planning:  Goal: Discharged to appropriate level of care  Description: Discharged to appropriate level of care  2/22/2022 1047 by Hanane Ochoa RN  Outcome: Completed  2/21/2022 2253 by Rianna Mota RN  Outcome: Ongoing     Problem: Constipation:  Goal: Bowel elimination is within specified parameters  Description: Bowel elimination is within specified parameters  2/22/2022 1047 by Hanane Ochoa RN  Outcome: Completed  2/21/2022 2253 by Rianna Mota RN  Outcome: Ongoing     Problem: Infection - Risk of, Puerperal Infection:  Goal: Will show no infection signs and symptoms  Description: Will show no infection signs and symptoms  2/22/2022 1047 by Hanane Ochoa RN  Outcome: Completed  2/21/2022 2253 by Rianna Mota RN  Outcome: Ongoing     Problem: Mood - Altered:  Goal: Mood stable  Description: Mood stable  2/22/2022 1047 by Hanane Ochoa RN  Outcome: Completed  2/21/2022 2253 by Rianna Mota RN  Outcome: Ongoing

## 2022-02-22 NOTE — PLAN OF CARE
Problem: Pain:  Goal: Pain level will decrease  Description: Pain level will decrease  2/21/2022 2253 by Leah Avitia RN  Outcome: Ongoing  2/21/2022 2011 by Hali Pritchard RN  Outcome: Ongoing  2/21/2022 2010 by Hali Pritchard RN  Outcome: Ongoing  2/21/2022 1107 by Pura Harvey RN  Outcome: Ongoing  2/21/2022 1107 by Pura Harvey RN  Outcome: Met This Shift  Goal: Control of acute pain  Description: Control of acute pain  2/21/2022 2253 by Leah Avitia RN  Outcome: Ongoing  2/21/2022 2011 by Hali Pritchard RN  Outcome: Ongoing  2/21/2022 2010 by Hali Pritchard RN  Outcome: Met This Shift  2/21/2022 1107 by Pura Harvey RN  Outcome: Ongoing  2/21/2022 1107 by Pura Harvey RN  Outcome: Met This Shift  Goal: Control of chronic pain  Description: Control of chronic pain  2/21/2022 2253 by Leah Avitia RN  Outcome: Ongoing  2/21/2022 2011 by Hali Pritchard RN  Outcome: Ongoing  2/21/2022 2010 by Hali Pritchard RN  Outcome: Met This Shift  2/21/2022 1107 by Pura Harvey RN  Outcome: Ongoing  2/21/2022 1107 by Pura Harvey RN  Outcome: Met This Shift     Problem: Anxiety:  Goal: Level of anxiety will decrease  Description: Level of anxiety will decrease  2/21/2022 2253 by Leah Avitia RN  Outcome: Ongoing  2/21/2022 2011 by Hali Pritchard RN  Outcome: Ongoing  2/21/2022 2010 by Hali Pritchard RN  Outcome: Met This Shift  2/21/2022 1107 by Pura Harvey RN  Outcome: Ongoing  2/21/2022 1107 by Pura Harvey RN  Outcome: Met This Shift     Problem: Breathing Pattern - Ineffective:  Goal: Able to breathe comfortably  Description: Able to breathe comfortably  2/21/2022 2253 by Leah Avitia RN  Outcome: Ongoing  2/21/2022 2011 by Hali Pritchard RN  Outcome: Ongoing  2/21/2022 1107 by Pura Harvey RN  Outcome: Ongoing  2/21/2022 1107 by Pura Harvey RN  Outcome: Met This Shift     Problem: Fluid Volume - Imbalance:  Goal: Absence of imbalanced fluid volume signs and symptoms  Description: Absence of imbalanced fluid volume signs and symptoms  2/21/2022 2253 by Beatrice Villanueva RN  Outcome: Ongoing  2/21/2022 2011 by Flo Barr RN  Outcome: Ongoing  2/21/2022 1107 by Jailene Hong RN  Outcome: Ongoing  2/21/2022 1107 by Jailene Hong RN  Outcome: Met This Shift  Goal: Absence of intrapartum hemorrhage signs and symptoms  Description: Absence of intrapartum hemorrhage signs and symptoms  2/21/2022 2253 by Beatrice Villanueva RN  Outcome: Ongoing  2/21/2022 2011 by Flo Barr RN  Outcome: Ongoing  2/21/2022 1107 by Jailene Hong RN  Outcome: Ongoing  2/21/2022 1107 by Jailene Hong RN  Outcome: Met This Shift  Goal: Absence of postpartum hemorrhage signs and symptoms  Description: Absence of postpartum hemorrhage signs and symptoms  2/21/2022 2253 by Beatrice Villanueva RN  Outcome: Ongoing  2/21/2022 2011 by Flo Barr RN  Outcome: Ongoing  2/21/2022 1107 by Jailene Hong RN  Outcome: Ongoing     Problem: Infection - Intrapartum Infection:  Goal: Will show no infection signs and symptoms  Description: Will show no infection signs and symptoms  2/21/2022 2253 by Beatrice Villanueva RN  Outcome: Ongoing  2/21/2022 2011 by Flo Barr RN  Outcome: Ongoing  2/21/2022 1107 by Jailene Hong RN  Outcome: Ongoing  2/21/2022 1107 by Jailene Hong RN  Outcome: Met This Shift     Problem: Labor Process - Prolonged:  Goal: Labor progression, first stage, within specified pattern  Description: Labor progression, first stage, within specified pattern  2/21/2022 2253 by Beatrice Villanueva RN  Outcome: Ongoing  2/21/2022 2011 by Flo Barr RN  Outcome: Ongoing  2/21/2022 1107 by Jailene Hong RN  Outcome: Ongoing  2/21/2022 1107 by Jailene Hong RN  Outcome: Met This Shift  Goal: Labor progession, second stage, within specified pattern  Description: Labor progession, second stage, within specified pattern  2/21/2022 2253 by Beatrice Villanueva RN  Outcome: Ongoing  2022 by Andreia Ornelas RN  Outcome: Ongoing  2022 1107 by Margit Fothergill, RN  Outcome: Ongoing  2022 1107 by Margit Fothergill, RN  Outcome: Met This Shift  Goal: Uterine contractions within specified parameters  Description: Uterine contractions within specified parameters  2022 by Yomi Vega RN  Outcome: Ongoing  2022 by Andreia Ornelas RN  Outcome: Ongoing  2022 1107 by Margit Fothergill, RN  Outcome: Ongoing  2022 1107 by Margit Fothergill, RN  Outcome: Met This Shift     Problem:  Screening:  Goal: Ability to make informed decisions regarding treatment has improved  Description: Ability to make informed decisions regarding treatment has improved  2022 by Yomi Vega RN  Outcome: Ongoing  2022 by Andreia Ornelas RN  Outcome: Ongoing  2022 110 by Margit Fothergill, RN  Outcome: Ongoing  2022 1107 by Margit Fothergill, RN  Outcome: Met This Shift     Problem: Pain - Acute:  Goal: Pain level will decrease  Description: Pain level will decrease  2022 by Yomi Vega RN  Outcome: Ongoing  2022 by Andreia Ornelas RN  Outcome: Ongoing  2022 by Andreia Ornelas RN  Outcome: Ongoing  2022 110 by Margit Fothergill, RN  Outcome: Ongoing  2022 1107 by Margit Fothergill, RN  Outcome: Met This Shift  Goal: Able to cope with pain  Description: Able to cope with pain  2022 by Yomi Vega RN  Outcome: Ongoing  2022 by Andreia Ornelas RN  Outcome: Ongoing  2022 1107 by Margit Fothergill, RN  Outcome: Ongoing  2022 110 by Margit Fothergill, RN  Outcome: Met This Shift     Problem: Tissue Perfusion - Uteroplacental, Altered:  Goal: Absence of abnormal fetal heart rate pattern  Description: Absence of abnormal fetal heart rate pattern  2022 by Yomi Dollar, RN  Outcome: Ongoing  2022 by Andreia Ornelas RN  Outcome: Ongoing  2022 1107 by Stanton Wiley Manuel Bourgeois RN  Outcome: Ongoing  2/21/2022 1107 by Jailene Hong RN  Outcome: Met This Shift     Problem: Urinary Retention:  Goal: Experiences of bladder distention will decrease  Description: Experiences of bladder distention will decrease  2/21/2022 2253 by Beatrice Villanueva RN  Outcome: Ongoing  2/21/2022 2011 by Flo Barr RN  Outcome: Ongoing  2/21/2022 1107 by Jailene Hong RN  Outcome: Ongoing  2/21/2022 1107 by Jailene Hong RN  Outcome: Met This Shift  Goal: Urinary elimination within specified parameters  Description: Urinary elimination within specified parameters  2/21/2022 2253 by Beatrice Villanueva RN  Outcome: Ongoing  2/21/2022 2011 by Flo Barr RN  Outcome: Ongoing  2/21/2022 1107 by Jailene Hong RN  Outcome: Ongoing  2/21/2022 1107 by Jailene Hong RN  Outcome: Met This Shift     Problem: Discharge Planning:  Goal: Discharged to appropriate level of care  Description: Discharged to appropriate level of care  2/21/2022 2253 by Beatrice Villanueva RN  Outcome: Ongoing  2/21/2022 2011 by Flo Barr RN  Outcome: Ongoing  2/21/2022 1107 by Jailene Hong RN  Outcome: Ongoing     Problem: Constipation:  Goal: Bowel elimination is within specified parameters  Description: Bowel elimination is within specified parameters  2/21/2022 2253 by Beatrice Villanueva RN  Outcome: Ongoing  2/21/2022 2011 by Flo Barr RN  Outcome: Ongoing  2/21/2022 1107 by Jailene Hong RN  Outcome: Ongoing     Problem: Infection - Risk of, Puerperal Infection:  Goal: Will show no infection signs and symptoms  Description: Will show no infection signs and symptoms  2/21/2022 2253 by Beatrice Villanueva RN  Outcome: Ongoing  2/21/2022 2011 by Flo Barr RN  Outcome: Ongoing  2/21/2022 1107 by Jailene Hong RN  Outcome: Ongoing  2/21/2022 1107 by Jailene Hong RN  Outcome: Met This Shift     Problem: Mood - Altered:  Goal: Mood stable  Description: Mood stable  2/21/2022 2253 by Beatrice Villanueva RN  Outcome: Ongoing  2/21/2022 2011 by Andreia Ornelas RN  Outcome: Ongoing  2/21/2022 1107 by Margit Fothergill, RN  Outcome: Ongoing

## 2022-02-25 NOTE — DISCHARGE SUMMARY
Vaginal Delivery  Discharge Summary       Reasons for Admission on: 2022  4:37 AM  Induction of Labor    Prenatal Procedures  None    Intrapartum Procedures  Delivery Method: N/A    Postpartum Procedures  None     Data  Information for the patient's :  Tania Thomas [[id number]]   female   Birth Weight: 7 lb 5.4 oz (3.327 kg)       Discharge With Mother  Complications: No    Discharge Diagnosis  Patient Active Problem List    Diagnosis Date Noted    45 weeks gestation of pregnancy     Normal labor     Admitted to labor and delivery        Discharge Information  Current Discharge Medication List        START taking these medications    Details   ibuprofen (ADVIL;MOTRIN) 600 MG tablet Take 1 tablet by mouth every 6 hours as needed for Pain  Qty: 30 tablet, Refills: 3           STOP taking these medications       Muapzq-XrAlc-XzGuh-Meth-FA-DHA (PRENATE MINI) 18-0.6-0.4-350 MG CAPS Comments:   Reason for Stopping:                 Discharge to: Home        Discharge Date:     Pao Hays MD  2022

## 2024-04-17 ENCOUNTER — OFFICE VISIT (OUTPATIENT)
Dept: PRIMARY CARE | Facility: CLINIC | Age: 34
End: 2024-04-17
Payer: COMMERCIAL

## 2024-04-17 ENCOUNTER — LAB (OUTPATIENT)
Dept: LAB | Facility: LAB | Age: 34
End: 2024-04-17
Payer: COMMERCIAL

## 2024-04-17 VITALS
WEIGHT: 240 LBS | HEIGHT: 63 IN | BODY MASS INDEX: 42.52 KG/M2 | DIASTOLIC BLOOD PRESSURE: 70 MMHG | SYSTOLIC BLOOD PRESSURE: 116 MMHG | HEART RATE: 98 BPM | RESPIRATION RATE: 18 BRPM | OXYGEN SATURATION: 98 %

## 2024-04-17 DIAGNOSIS — G43.109 MIGRAINE WITH AURA AND WITHOUT STATUS MIGRAINOSUS, NOT INTRACTABLE: ICD-10-CM

## 2024-04-17 DIAGNOSIS — Z00.00 ROUTINE GENERAL MEDICAL EXAMINATION AT HEALTH CARE FACILITY: ICD-10-CM

## 2024-04-17 DIAGNOSIS — Z00.00 ROUTINE GENERAL MEDICAL EXAMINATION AT HEALTH CARE FACILITY: Primary | ICD-10-CM

## 2024-04-17 PROBLEM — Z98.891 HX OF CESAREAN SECTION: Status: ACTIVE | Noted: 2024-04-17

## 2024-04-17 LAB
25(OH)D3 SERPL-MCNC: 18 NG/ML (ref 30–100)
ALBUMIN SERPL BCP-MCNC: 4.5 G/DL (ref 3.4–5)
ALP SERPL-CCNC: 94 U/L (ref 33–110)
ALT SERPL W P-5'-P-CCNC: 30 U/L (ref 7–45)
ANION GAP SERPL CALC-SCNC: 12 MMOL/L (ref 10–20)
AST SERPL W P-5'-P-CCNC: 20 U/L (ref 9–39)
BILIRUB SERPL-MCNC: 0.5 MG/DL (ref 0–1.2)
BUN SERPL-MCNC: 9 MG/DL (ref 6–23)
CALCIUM SERPL-MCNC: 9.2 MG/DL (ref 8.6–10.3)
CHLORIDE SERPL-SCNC: 104 MMOL/L (ref 98–107)
CHOLEST SERPL-MCNC: 179 MG/DL (ref 0–199)
CHOLESTEROL/HDL RATIO: 3.6
CO2 SERPL-SCNC: 29 MMOL/L (ref 21–32)
CREAT SERPL-MCNC: 0.8 MG/DL (ref 0.5–1.05)
EGFRCR SERPLBLD CKD-EPI 2021: >90 ML/MIN/1.73M*2
ERYTHROCYTE [DISTWIDTH] IN BLOOD BY AUTOMATED COUNT: 13.2 % (ref 11.5–14.5)
EST. AVERAGE GLUCOSE BLD GHB EST-MCNC: 105 MG/DL
GLUCOSE SERPL-MCNC: 93 MG/DL (ref 74–99)
HBA1C MFR BLD: 5.3 %
HCT VFR BLD AUTO: 46.5 % (ref 36–46)
HDLC SERPL-MCNC: 49.6 MG/DL
HGB BLD-MCNC: 15 G/DL (ref 12–16)
INSULIN SERPL-ACNC: 32 UIU/ML (ref 3–25)
LDLC SERPL CALC-MCNC: 113 MG/DL
MCH RBC QN AUTO: 30.4 PG (ref 26–34)
MCHC RBC AUTO-ENTMCNC: 32.3 G/DL (ref 32–36)
MCV RBC AUTO: 94 FL (ref 80–100)
NON HDL CHOLESTEROL: 129 MG/DL (ref 0–149)
NRBC BLD-RTO: 0 /100 WBCS (ref 0–0)
PLATELET # BLD AUTO: 330 X10*3/UL (ref 150–450)
POTASSIUM SERPL-SCNC: 4.5 MMOL/L (ref 3.5–5.3)
PROLACTIN SERPL-MCNC: 5.4 UG/L (ref 3–20)
PROT SERPL-MCNC: 7.5 G/DL (ref 6.4–8.2)
RBC # BLD AUTO: 4.94 X10*6/UL (ref 4–5.2)
SODIUM SERPL-SCNC: 140 MMOL/L (ref 136–145)
TRIGL SERPL-MCNC: 82 MG/DL (ref 0–149)
TSH SERPL-ACNC: 1.24 MIU/L (ref 0.44–3.98)
VIT B12 SERPL-MCNC: 298 PG/ML (ref 211–911)
VLDL: 16 MG/DL (ref 0–40)
WBC # BLD AUTO: 7.8 X10*3/UL (ref 4.4–11.3)

## 2024-04-17 PROCEDURE — 84443 ASSAY THYROID STIM HORMONE: CPT

## 2024-04-17 PROCEDURE — 82306 VITAMIN D 25 HYDROXY: CPT

## 2024-04-17 PROCEDURE — 82607 VITAMIN B-12: CPT

## 2024-04-17 PROCEDURE — 85027 COMPLETE CBC AUTOMATED: CPT

## 2024-04-17 PROCEDURE — 80061 LIPID PANEL: CPT

## 2024-04-17 PROCEDURE — 80053 COMPREHEN METABOLIC PANEL: CPT

## 2024-04-17 PROCEDURE — 36415 COLL VENOUS BLD VENIPUNCTURE: CPT

## 2024-04-17 PROCEDURE — 84146 ASSAY OF PROLACTIN: CPT

## 2024-04-17 PROCEDURE — 83036 HEMOGLOBIN GLYCOSYLATED A1C: CPT

## 2024-04-17 PROCEDURE — 83525 ASSAY OF INSULIN: CPT

## 2024-04-17 RX ORDER — SUMATRIPTAN 50 MG/1
50 TABLET, FILM COATED ORAL ONCE AS NEEDED
Qty: 27 TABLET | Refills: 0 | Status: SHIPPED | OUTPATIENT
Start: 2024-04-17 | End: 2024-05-17

## 2024-04-17 RX ORDER — ONDANSETRON 4 MG/1
4 TABLET, ORALLY DISINTEGRATING ORAL EVERY 8 HOURS PRN
Qty: 20 TABLET | Refills: 0 | Status: SHIPPED | OUTPATIENT
Start: 2024-04-17 | End: 2024-04-24

## 2024-04-17 ASSESSMENT — ENCOUNTER SYMPTOMS
CLUSTER HEADACHES: 0
BLURRED VISION: 1
MIGRAINE HEADACHES: 1
PHOTOPHOBIA: 1

## 2024-04-17 ASSESSMENT — ANXIETY QUESTIONNAIRES
2. NOT BEING ABLE TO STOP OR CONTROL WORRYING: NOT AT ALL
1. FEELING NERVOUS, ANXIOUS, OR ON EDGE: NOT AT ALL
GAD7 TOTAL SCORE: 0
IF YOU CHECKED OFF ANY PROBLEMS ON THIS QUESTIONNAIRE, HOW DIFFICULT HAVE THESE PROBLEMS MADE IT FOR YOU TO DO YOUR WORK, TAKE CARE OF THINGS AT HOME, OR GET ALONG WITH OTHER PEOPLE: NOT DIFFICULT AT ALL
7. FEELING AFRAID AS IF SOMETHING AWFUL MIGHT HAPPEN: NOT AT ALL
6. BECOMING EASILY ANNOYED OR IRRITABLE: NOT AT ALL
5. BEING SO RESTLESS THAT IT IS HARD TO SIT STILL: NOT AT ALL
4. TROUBLE RELAXING: NOT AT ALL
3. WORRYING TOO MUCH ABOUT DIFFERENT THINGS: NOT AT ALL

## 2024-04-17 ASSESSMENT — PATIENT HEALTH QUESTIONNAIRE - PHQ9
SUM OF ALL RESPONSES TO PHQ9 QUESTIONS 1 AND 2: 0
1. LITTLE INTEREST OR PLEASURE IN DOING THINGS: NOT AT ALL
2. FEELING DOWN, DEPRESSED OR HOPELESS: NOT AT ALL

## 2024-04-17 NOTE — PROGRESS NOTES
"Subjective   Patient ID: Arielle Andres is a 34 y.o. female who presents for Establish Care.    Last PAP-2021 when she had her last daughter who is now 2. Dr. Bennett OB-GYN now with [facility]. Likely due in 2026.   Patient states most days she is in a fog that feels like it is going to tip into a migraine- it makes her nervous because her mother had a significant brain tumor and so did her grandmother and there was a genetic link but she would be at higher risk. 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     Feels like she has gained a ton of weight since her last daughter was born and she is noticing body aches due to increased weight. Sleeping problems, numbness, pain and think.    Diet patterns- hasn't had consistent diet.   Exercise-starting to get back into walking more and moving her body more. Has no solid routine.      Migraine   This is a new (headaches (fog) premigraine- 1-2 days a week month times a month. 5 days a month with aura.) problem. Episode onset: true migraines at least once per month. Sees spots with aura. The problem occurs intermittently. Associated symptoms include blurred vision, hearing loss, phonophobia and photophobia. Associated symptoms comments: Looses peripheral vision, sees dots, has to be in quiet room. Migraine causes a warm flushing- sensory input noises smells bright lights unbearable. . Nothing aggravates the symptoms. She has tried NSAIDs for the symptoms. Her past medical history is significant for migraine headaches and migraines in the family. There is no history of cancer, cluster headaches, hypertension, immunosuppression, obesity, pseudotumor cerebri, recent head traumas, sinus disease or TMJ.        Review of Systems   HENT:  Positive for hearing loss.    Eyes:  Positive for blurred vision and photophobia.       Objective   /70   Pulse 98   Resp 18   Ht 1.6 m (5' 3\")   Wt 109 kg (240 lb)   SpO2 98%   BMI 42.51 kg/m²     Physical " Exam  Vitals and nursing note reviewed.   Constitutional:       Appearance: Normal appearance.   HENT:      Head: Normocephalic and atraumatic.      Nose: Nose normal.      Mouth/Throat:      Mouth: Mucous membranes are moist.   Eyes:      Pupils: Pupils are equal, round, and reactive to light.   Cardiovascular:      Rate and Rhythm: Normal rate and regular rhythm.      Pulses: Normal pulses.      Heart sounds: Normal heart sounds. No murmur heard.     No friction rub. No gallop.   Pulmonary:      Effort: Pulmonary effort is normal. No respiratory distress.      Breath sounds: Normal breath sounds. No stridor. No wheezing, rhonchi or rales.   Chest:      Chest wall: No tenderness.   Abdominal:      Palpations: Abdomen is soft.   Musculoskeletal:         General: Normal range of motion.      Cervical back: Normal range of motion.   Skin:     Capillary Refill: Capillary refill takes 2 to 3 seconds.   Neurological:      General: No focal deficit present.      Mental Status: She is alert and oriented to person, place, and time.      GCS: GCS eye subscore is 4. GCS verbal subscore is 5. GCS motor subscore is 6.      Cranial Nerves: Cranial nerves 2-12 are intact.      Sensory: Sensation is intact.      Motor: Motor function is intact.      Coordination: Coordination is intact.      Gait: Gait is intact.      Deep Tendon Reflexes: Reflexes are normal and symmetric. Reflexes normal.         Assessment/Plan   Problem List Items Addressed This Visit    None  Visit Diagnoses         Codes    Routine general medical examination at health care facility    -  Primary Z00.00    Relevant Orders    Vitamin D 25-Hydroxy,Total (for eval of Vitamin D levels) (Completed)    TSH with reflex to Free T4 if abnormal (Completed)    Lipid Panel (Completed)    CBC (Completed)    Hemoglobin A1C (Completed)    Comprehensive Metabolic Panel (Completed)    Vitamin B12 (Completed)    Insulin, random (Completed)    Migraine with aura and without  status migrainosus, not intractable     G43.109    Relevant Medications    SUMAtriptan (Imitrex) 50 mg tablet    ondansetron ODT (Zofran-ODT) 4 mg disintegrating tablet    Other Relevant Orders    Prolactin level (Completed)    MR brain wo IV contrast

## 2024-05-01 ENCOUNTER — HOSPITAL ENCOUNTER (OUTPATIENT)
Dept: RADIOLOGY | Facility: HOSPITAL | Age: 34
Discharge: HOME | End: 2024-05-01
Payer: COMMERCIAL

## 2024-05-01 DIAGNOSIS — G43.109 MIGRAINE WITH AURA AND WITHOUT STATUS MIGRAINOSUS, NOT INTRACTABLE: ICD-10-CM

## 2024-05-01 PROCEDURE — 70551 MRI BRAIN STEM W/O DYE: CPT

## 2024-05-01 PROCEDURE — 70551 MRI BRAIN STEM W/O DYE: CPT | Performed by: RADIOLOGY

## 2024-05-17 ENCOUNTER — APPOINTMENT (OUTPATIENT)
Dept: PRIMARY CARE | Facility: CLINIC | Age: 34
End: 2024-05-17
Payer: COMMERCIAL

## 2024-06-26 ENCOUNTER — APPOINTMENT (OUTPATIENT)
Dept: PRIMARY CARE | Facility: CLINIC | Age: 34
End: 2024-06-26
Payer: COMMERCIAL

## 2024-06-26 VITALS
HEIGHT: 63 IN | OXYGEN SATURATION: 97 % | BODY MASS INDEX: 41.99 KG/M2 | DIASTOLIC BLOOD PRESSURE: 64 MMHG | HEART RATE: 82 BPM | RESPIRATION RATE: 18 BRPM | SYSTOLIC BLOOD PRESSURE: 116 MMHG | WEIGHT: 237 LBS

## 2024-06-26 DIAGNOSIS — E66.01 CLASS 3 SEVERE OBESITY DUE TO EXCESS CALORIES WITH BODY MASS INDEX (BMI) OF 40.0 TO 44.9 IN ADULT, UNSPECIFIED WHETHER SERIOUS COMORBIDITY PRESENT (MULTI): Primary | ICD-10-CM

## 2024-06-26 PROBLEM — G43.109 MIGRAINE WITH AURA: Status: ACTIVE | Noted: 2024-04-17

## 2024-06-26 PROCEDURE — 3008F BODY MASS INDEX DOCD: CPT | Performed by: NURSE PRACTITIONER

## 2024-06-26 PROCEDURE — 99213 OFFICE O/P EST LOW 20 MIN: CPT | Performed by: NURSE PRACTITIONER

## 2024-06-26 RX ORDER — PHENTERMINE HYDROCHLORIDE 15 MG/1
15 CAPSULE ORAL
Qty: 30 CAPSULE | Refills: 0 | Status: SHIPPED | OUTPATIENT
Start: 2024-06-26 | End: 2024-07-26

## 2024-06-26 ASSESSMENT — ANXIETY QUESTIONNAIRES
2. NOT BEING ABLE TO STOP OR CONTROL WORRYING: NOT AT ALL
5. BEING SO RESTLESS THAT IT IS HARD TO SIT STILL: NOT AT ALL
IF YOU CHECKED OFF ANY PROBLEMS ON THIS QUESTIONNAIRE, HOW DIFFICULT HAVE THESE PROBLEMS MADE IT FOR YOU TO DO YOUR WORK, TAKE CARE OF THINGS AT HOME, OR GET ALONG WITH OTHER PEOPLE: NOT DIFFICULT AT ALL
GAD7 TOTAL SCORE: 0
6. BECOMING EASILY ANNOYED OR IRRITABLE: NOT AT ALL
4. TROUBLE RELAXING: NOT AT ALL
3. WORRYING TOO MUCH ABOUT DIFFERENT THINGS: NOT AT ALL
1. FEELING NERVOUS, ANXIOUS, OR ON EDGE: NOT AT ALL
7. FEELING AFRAID AS IF SOMETHING AWFUL MIGHT HAPPEN: NOT AT ALL

## 2024-06-26 ASSESSMENT — ENCOUNTER SYMPTOMS
CONSTITUTIONAL NEGATIVE: 1
RESPIRATORY NEGATIVE: 1
CARDIOVASCULAR NEGATIVE: 1
PALPITATIONS: 0
GASTROINTESTINAL NEGATIVE: 1

## 2024-06-26 NOTE — PROGRESS NOTES
"Subjective   Patient ID: Arielle Andres is a 34 y.o. female who presents for Follow-up.    Subjective  Arielle Andres is a 34 y.o. female who presents for follow-up of migraine headaches. Home treatment has included Imitrex oral with little improvement. Headaches are occurring none since seeing me 04/17. Work attendance or other daily activities are not affected by the headaches. The patient denies any symptoms of migraine. She is a little bit sick today from travelling but  otherwise okay.     Patient reports  three kids and mother in law were just in [place] for 3 weeks- tour of most of the country. She has an IUD in place right now.     Patient reports she is eating better and she redownloaded [employer] lencho which has helped her in the past. She got down 5 lbs and couldn't get under 240 but they were walking 20,000 steps daily- and did not. Morning health breakfast lunch is irregular sometimes snacking- and evening she catches up from not eating well.     Waist 46.5 inches for next measurement     Assessment/Plan  migraines. Imitrex- let me know if needing it more than 3 days a month.   Continue present treatment and plan.           Review of Systems   Constitutional: Negative.    HENT: Negative.     Respiratory: Negative.     Cardiovascular: Negative.  Negative for chest pain, palpitations and leg swelling.   Gastrointestinal: Negative.        Objective   /64   Pulse 82   Resp 18   Ht 1.6 m (5' 3\")   Wt 108 kg (237 lb)   SpO2 97%   BMI 41.98 kg/m²     Physical Exam  Vitals reviewed.   HENT:      Head: Normocephalic.   Cardiovascular:      Rate and Rhythm: Normal rate and regular rhythm.      Pulses: Normal pulses.      Heart sounds: Normal heart sounds. No murmur heard.     No friction rub. No gallop.   Pulmonary:      Effort: Pulmonary effort is normal. No respiratory distress.      Breath sounds: Normal breath sounds. No stridor. No wheezing, rhonchi or rales.   Chest:      Chest wall: No " tenderness.   Neurological:      General: No focal deficit present.      Mental Status: She is alert and oriented to person, place, and time. Mental status is at baseline.         Assessment/Plan   Problem List Items Addressed This Visit    None  Visit Diagnoses         Codes    Class 3 severe obesity due to excess calories with body mass index (BMI) of 40.0 to 44.9 in adult, unspecified whether serious comorbidity present (Multi)    -  Primary E66.01, Z68.41    Relevant Medications    phentermine 15 mg capsule

## 2024-06-26 NOTE — PATIENT INSTRUCTIONS
1 month follow up   Tips for a healthy lifestyle  -sleep 7-8 hours a night  -walk 8,000-10,000 steps per day   -limit screen time and mindless scrolling  -cut out fast food, processed food, seed oils and added sugars  -strength train 3x/week  -focus on stretching and mobility  -cut out toxic relationships/people/things that bring you down  -communicate your feelings  -eat 1900 calories per day (aim for 1 gram of protein per lb of body weight)  -no caffeine before food  -don't skip breakfast  -rest when your body is telling you to  -do a minimum of 10 minutes of silent breathing (meditation) in the morning  -sleep with your phone in a different room  -give more hugs, be more squishy  -tell people you care about that you love them  -express yourself in ways that make you feel good and alive

## 2024-07-31 ENCOUNTER — APPOINTMENT (OUTPATIENT)
Dept: PRIMARY CARE | Facility: CLINIC | Age: 34
End: 2024-07-31
Payer: COMMERCIAL

## 2024-07-31 VITALS
WEIGHT: 236 LBS | BODY MASS INDEX: 41.82 KG/M2 | HEIGHT: 63 IN | HEART RATE: 85 BPM | DIASTOLIC BLOOD PRESSURE: 88 MMHG | RESPIRATION RATE: 18 BRPM | OXYGEN SATURATION: 96 % | SYSTOLIC BLOOD PRESSURE: 130 MMHG

## 2024-07-31 DIAGNOSIS — E66.01 CLASS 3 SEVERE OBESITY DUE TO EXCESS CALORIES WITH BODY MASS INDEX (BMI) OF 40.0 TO 44.9 IN ADULT, UNSPECIFIED WHETHER SERIOUS COMORBIDITY PRESENT (MULTI): Primary | ICD-10-CM

## 2024-07-31 PROCEDURE — 3008F BODY MASS INDEX DOCD: CPT | Performed by: NURSE PRACTITIONER

## 2024-07-31 PROCEDURE — 99212 OFFICE O/P EST SF 10 MIN: CPT | Performed by: NURSE PRACTITIONER

## 2024-07-31 RX ORDER — PHENTERMINE HYDROCHLORIDE 37.5 MG/1
37.5 TABLET ORAL
Qty: 30 TABLET | Refills: 0 | Status: SHIPPED | OUTPATIENT
Start: 2024-07-31 | End: 2024-08-30

## 2024-07-31 SDOH — SOCIAL STABILITY: SOCIAL INSECURITY: RISK FACTORS: NO KNOWN RISK FACTORS

## 2024-07-31 ASSESSMENT — ENCOUNTER SYMPTOMS
HOPELESSNESS: 1
THOUGHTS THAT DEATH WOULD BE EASIER: 0
PANIC: 0
CARDIOVASCULAR NEGATIVE: 1
INSOMNIA: 1
HOURS OF SLEEP PER NIGHT: 8 HOURS
DEPRESSION: 1
RESPIRATORY NEGATIVE: 1
WEIGHT LOSS: 0
PSYCHOMOTOR AGITATION: 0
FEELINGS OF WORTHLESSNESS: 0
NERVOUS/ANXIOUS: 0
HYPERVENTILATION: 0
MEMORY IMPAIRMENT: 0
WHEEZING: 0
CONFUSION: 0
RESTLESSNESS: 1
SLEEP QUALITY: GOOD
COUGH: 0
COMPULSIONS: 0
APNEA: 0
SHORTNESS OF BREATH: 0
STRIDOR: 0
DEPRESSED MOOD: 1
THOUGHT CONTENT - OBSESSIONS: 0
WEIGHT GAIN: 0
IRRITABILITY: 1
HYPERSOMNIA: 0
MUSCLE TENSION: 0
DECREASED CONCENTRATION: 0
PSYCHOMOTOR RETARDATION: 0
MALAISE: 0
PALPITATIONS: 0
CHOKING: 0
CHEST TIGHTNESS: 0

## 2024-07-31 ASSESSMENT — LIFESTYLE VARIABLES: SUBSTANCE_ABUSE: 0

## 2024-07-31 NOTE — PROGRESS NOTES
Subjective   Patient ID: Arielle Andres is a 34 y.o. female who presents for Follow-up (Started on new meds).    Pt started phentermine 1 month ago. She reports her appetite is suppressed a bit. But she has been more sad/down/depressed- not tearful but has been. She has had to cut down to half a cup of coffee.  She is exercising as much as she can - getting steps in, drinking water most days.   Last read was 46.5 and now is 44 inches.       Depression  Visit Type: initial  Onset of symptoms: 1-4 weeks ago  Progression since onset: waxing and waning  Patient presents with the following symptoms: depressed mood, fatigue, feelings of hopelessness, insomnia, irritability and restlessness.  Patient is not experiencing: choking sensation, compulsions, confusion, decreased concentration, dizziness, dry mouth, excessive worry, feelings of worthlessness, hypersomnia, hyperventilation, impotence, malaise, memory impairment, muscle tension, nausea, nervousness/anxiety, obsessions, palpitations, panic, psychomotor agitation, psychomotor retardation, shortness of breath, suicidal ideas, suicidal planning, thoughts of death, weight gain and weight loss.  Frequency of symptoms: rarely   Severity: mild   Sleep per night: 8 hours  Sleep quality: good  Risk factors: no known risk factors (normal life stressors)  Patient has a history of: depression  No history of: anemia, anxiety/panic attacks, arrhythmia, asthma, bipolar disorder, CAD, CHF, chronic lung disease, fibromyalgia, hyperthyroidism, suicide attempt, mental illness and substance abuse  Treatment tried: nothing  Compliance with treatment: good  Improvement on treatment: moderate         Review of Systems   Constitutional:  Positive for irritability. Negative for weight gain and weight loss.   Respiratory: Negative.  Negative for apnea, cough, choking, chest tightness, shortness of breath, wheezing and stridor.    Cardiovascular: Negative.  Negative for chest pain,  "palpitations and leg swelling.   Genitourinary:  Negative for impotence.   Psychiatric/Behavioral:  Positive for depression. Negative for confusion, decreased concentration, substance abuse and suicidal ideas. The patient has insomnia. The patient is not nervous/anxious.        Objective   /88   Pulse 85   Resp 18   Ht 1.6 m (5' 3\")   Wt 107 kg (236 lb)   SpO2 96%   BMI 41.81 kg/m²     Physical Exam  Vitals and nursing note reviewed.   Constitutional:       Appearance: Normal appearance.   HENT:      Head: Normocephalic and atraumatic.      Nose: Nose normal.      Mouth/Throat:      Mouth: Mucous membranes are moist.   Eyes:      Pupils: Pupils are equal, round, and reactive to light.   Neck:      Vascular: No carotid bruit.   Cardiovascular:      Rate and Rhythm: Normal rate and regular rhythm.      Pulses: Normal pulses.      Heart sounds: Normal heart sounds.   Pulmonary:      Effort: Pulmonary effort is normal.      Breath sounds: Normal breath sounds.   Abdominal:      Palpations: Abdomen is soft.   Musculoskeletal:         General: Normal range of motion.      Cervical back: Normal range of motion. No rigidity or tenderness.   Lymphadenopathy:      Cervical: No cervical adenopathy.   Skin:     Capillary Refill: Capillary refill takes 2 to 3 seconds.   Neurological:      General: No focal deficit present.      Mental Status: She is alert and oriented to person, place, and time.      Cranial Nerves: Cranial nerves 2-12 are intact.      Sensory: Sensation is intact.      Motor: Motor function is intact.      Deep Tendon Reflexes: Reflexes are normal and symmetric.         Assessment/Plan   Problem List Items Addressed This Visit    None  Visit Diagnoses         Codes    Class 3 severe obesity due to excess calories with body mass index (BMI) of 40.0 to 44.9 in adult, unspecified whether serious comorbidity present (Multi)    -  Primary E66.01, Z68.41    Relevant Medications    phentermine (Adipex-P) " 37.5 mg tablet          We discussed trailing GLP once she has done three months of treatment.

## 2024-09-17 DIAGNOSIS — E66.01 CLASS 3 SEVERE OBESITY DUE TO EXCESS CALORIES WITH BODY MASS INDEX (BMI) OF 40.0 TO 44.9 IN ADULT, UNSPECIFIED WHETHER SERIOUS COMORBIDITY PRESENT: ICD-10-CM

## 2024-09-17 RX ORDER — PHENTERMINE HYDROCHLORIDE 37.5 MG/1
37.5 TABLET ORAL
Qty: 30 TABLET | Refills: 0 | Status: SHIPPED | OUTPATIENT
Start: 2024-09-17 | End: 2024-10-17

## 2024-10-01 ENCOUNTER — APPOINTMENT (OUTPATIENT)
Dept: PRIMARY CARE | Facility: CLINIC | Age: 34
End: 2024-10-01
Payer: COMMERCIAL

## 2024-10-15 ENCOUNTER — DOCUMENTATION (OUTPATIENT)
Dept: PRIMARY CARE | Facility: CLINIC | Age: 34
End: 2024-10-15
Payer: COMMERCIAL

## 2024-10-15 NOTE — PROGRESS NOTES
Discharge Facility: [facility]   Discharge Diagnosis: Abdominal pain, Ureterolithiasis  Admission Date: 10/13/2024  Discharge Date: 10/14/2024    PCP Appointment Date:  -10/16/2024 0840    Hospital Encounter and Summary Linked: Yes    Pt has PCP appt within 2 days of discharge. No outreach call made at this time.

## 2024-10-16 ENCOUNTER — APPOINTMENT (OUTPATIENT)
Dept: PRIMARY CARE | Facility: CLINIC | Age: 34
End: 2024-10-16
Payer: COMMERCIAL

## 2024-10-16 VITALS
RESPIRATION RATE: 18 BRPM | BODY MASS INDEX: 39.87 KG/M2 | HEIGHT: 63 IN | WEIGHT: 225 LBS | DIASTOLIC BLOOD PRESSURE: 74 MMHG | HEART RATE: 91 BPM | SYSTOLIC BLOOD PRESSURE: 116 MMHG

## 2024-10-16 DIAGNOSIS — E66.813 CLASS 3 SEVERE OBESITY DUE TO EXCESS CALORIES WITH BODY MASS INDEX (BMI) OF 40.0 TO 44.9 IN ADULT, UNSPECIFIED WHETHER SERIOUS COMORBIDITY PRESENT: Primary | ICD-10-CM

## 2024-10-16 DIAGNOSIS — Z00.00 HEALTHCARE MAINTENANCE: ICD-10-CM

## 2024-10-16 DIAGNOSIS — E66.01 CLASS 3 SEVERE OBESITY DUE TO EXCESS CALORIES WITH BODY MASS INDEX (BMI) OF 40.0 TO 44.9 IN ADULT, UNSPECIFIED WHETHER SERIOUS COMORBIDITY PRESENT: Primary | ICD-10-CM

## 2024-10-16 DIAGNOSIS — G43.109 MIGRAINE WITH AURA AND WITHOUT STATUS MIGRAINOSUS, NOT INTRACTABLE: ICD-10-CM

## 2024-10-16 DIAGNOSIS — N20.0 KIDNEY STONE: ICD-10-CM

## 2024-10-16 PROCEDURE — 90471 IMMUNIZATION ADMIN: CPT | Performed by: NURSE PRACTITIONER

## 2024-10-16 PROCEDURE — 90656 IIV3 VACC NO PRSV 0.5 ML IM: CPT | Performed by: NURSE PRACTITIONER

## 2024-10-16 PROCEDURE — 1036F TOBACCO NON-USER: CPT | Performed by: NURSE PRACTITIONER

## 2024-10-16 PROCEDURE — 3008F BODY MASS INDEX DOCD: CPT | Performed by: NURSE PRACTITIONER

## 2024-10-16 PROCEDURE — 99213 OFFICE O/P EST LOW 20 MIN: CPT | Performed by: NURSE PRACTITIONER

## 2024-10-16 RX ORDER — PHENTERMINE HYDROCHLORIDE 37.5 MG/1
37.5 TABLET ORAL
Qty: 30 TABLET | Refills: 0 | Status: SHIPPED | OUTPATIENT
Start: 2024-10-16 | End: 2024-11-15

## 2024-10-16 RX ORDER — CEPHALEXIN 500 MG/1
500 CAPSULE ORAL 4 TIMES DAILY
COMMUNITY
Start: 2024-10-15 | End: 2024-10-20

## 2024-10-16 RX ORDER — TAMSULOSIN HYDROCHLORIDE 0.4 MG/1
0.4 CAPSULE ORAL DAILY
COMMUNITY
Start: 2024-10-14 | End: 2024-10-28

## 2024-10-16 ASSESSMENT — ENCOUNTER SYMPTOMS
GASTROINTESTINAL NEGATIVE: 1
MUSCULOSKELETAL NEGATIVE: 1
EYES NEGATIVE: 1
CONSTITUTIONAL NEGATIVE: 1
CARDIOVASCULAR NEGATIVE: 1
NEUROLOGICAL NEGATIVE: 1
ENDOCRINE NEGATIVE: 1
PSYCHIATRIC NEGATIVE: 1
RESPIRATORY NEGATIVE: 1
HEMATOLOGIC/LYMPHATIC NEGATIVE: 1
ALLERGIC/IMMUNOLOGIC NEGATIVE: 1

## 2024-10-16 ASSESSMENT — PAIN SCALES - GENERAL: PAINLEVEL_OUTOF10: 1

## 2024-10-16 NOTE — PATIENT INSTRUCTIONS
Follow up for general medical exam in April, or sooner if needed for continued weight management. Keep up the good work!

## 2024-10-16 NOTE — PROGRESS NOTES
Subjective   Patient ID: Arielle Andres is a 34 y.o. female who presents for Follow-up.    Pt was at [facility] ED 10/13/24 for nephrolithiasis. Pain level is a 1.  Patient had kidney stone once a long time ago, she has a 0.5 cm obstructing kidney stone There is an obstructive stone within the left   vesicoureteral junction which measures up to 0.5 cm. Making good urine no blood in it that she can see.   ER Note   Arielle Andres is a 34 year old female presented with past medical history of Nephrolithiasis, prior  section who presents with flank pain. In ED CT obtained showing obstructive vesicoureteral junction stone which measures 0.5 cm with associated mild L Hydroureteronephrosis. Creatinine stable. UA + and given CTX, UC mixed microbiota. Urology consulted in ED and stated if pain tolerable could DC on ABX. Pain was initially uncontrolled in ED, but completely resolved upon arrival to [facility]. Patient discharged with flomax, keflex, urine strainer and follow up with urology and PCP. Follow up with urology next Friday- will order renal ultrasound to be completed before appt.     Phentermine is helpful only recurring symptom that she notices is insomnia but it is worth with stressful days- but not consistently ongoing problem. This will be her 4th month of phentermine but first two months were subtherapeutic dose at 15 mg so agreed to 1 more month, BP and HR stable.   Headaches are better- no migraines just dull headache/symptoms or fuzziness.   Positive for 11 lb weight loss. Last read was 46.5 and now is 44 inches now 43.5 .     Scheduled GYN for PAP seeing Dr. Campuzano.        Review of Systems   Constitutional: Negative.    HENT: Negative.     Eyes: Negative.    Respiratory: Negative.     Cardiovascular: Negative.    Gastrointestinal: Negative.    Endocrine: Negative.    Genitourinary: Negative.    Musculoskeletal: Negative.    Skin: Negative.    Allergic/Immunologic: Negative.    Neurological: Negative.   "  Hematological: Negative.    Psychiatric/Behavioral: Negative.         Objective   /74   Pulse 91   Resp 18   Ht 1.6 m (5' 3\")   Wt 102 kg (225 lb)   BMI 39.86 kg/m²     Physical Exam  Vitals reviewed.   HENT:      Head: Normocephalic.   Neck:      Vascular: Normal carotid pulses. No carotid bruit, hepatojugular reflux or JVD.   Cardiovascular:      Rate and Rhythm: Normal rate and regular rhythm.      Pulses: Normal pulses.      Heart sounds: Normal heart sounds. No murmur heard.     No friction rub. No gallop.   Pulmonary:      Effort: Pulmonary effort is normal.      Breath sounds: Normal breath sounds.   Musculoskeletal:      Right lower leg: No edema.      Left lower leg: No edema.   Neurological:      General: No focal deficit present.      Mental Status: She is alert and oriented to person, place, and time. Mental status is at baseline.         Assessment/Plan   Problem List Items Addressed This Visit             ICD-10-CM    Migraine with aura G43.109     Stable continue PRN medications.   If you have more than 3 migraine days per month please let me know.   Maintain adequate hydration and sleep routines.           Other Visit Diagnoses         Codes    Class 3 severe obesity due to excess calories with body mass index (BMI) of 40.0 to 44.9 in adult, unspecified whether serious comorbidity present    -  Primary E66.813, E66.01, Z68.41    Relevant Medications    phentermine (Adipex-P) 37.5 mg tablet    Kidney stone     N20.0    Relevant Orders     renal complete    Healthcare maintenance     Z00.00    Relevant Orders    Follow Up In [facility] - PCP - Established               "

## 2024-10-16 NOTE — ASSESSMENT & PLAN NOTE
Stable continue PRN medications.   If you have more than 3 migraine days per month please let me know.   Maintain adequate hydration and sleep routines.

## 2024-10-17 ENCOUNTER — PATIENT OUTREACH (OUTPATIENT)
Dept: PRIMARY CARE | Facility: CLINIC | Age: 34
End: 2024-10-17
Payer: COMMERCIAL

## 2024-10-17 NOTE — PROGRESS NOTES
Unable to reach patient for call back after recent hospitalization and PCP follow up.   Left voicemail with call back number for patient to call if needed   If no voicemail available call attempts x 2 were made to contact the patient to assist with any questions or concerns patient may have.

## 2024-10-21 ENCOUNTER — APPOINTMENT (OUTPATIENT)
Dept: RADIOLOGY | Facility: CLINIC | Age: 34
End: 2024-10-21
Payer: COMMERCIAL

## 2024-11-19 ENCOUNTER — PATIENT OUTREACH (OUTPATIENT)
Dept: PRIMARY CARE | Facility: CLINIC | Age: 34
End: 2024-11-19
Payer: COMMERCIAL

## 2025-04-16 ENCOUNTER — APPOINTMENT (OUTPATIENT)
Dept: PRIMARY CARE | Facility: CLINIC | Age: 35
End: 2025-04-16
Payer: COMMERCIAL

## 2025-04-16 VITALS
OXYGEN SATURATION: 98 % | TEMPERATURE: 97.2 F | HEART RATE: 80 BPM | DIASTOLIC BLOOD PRESSURE: 82 MMHG | WEIGHT: 238 LBS | RESPIRATION RATE: 16 BRPM | BODY MASS INDEX: 42.17 KG/M2 | SYSTOLIC BLOOD PRESSURE: 122 MMHG | HEIGHT: 63 IN

## 2025-04-16 DIAGNOSIS — E66.813 CLASS 3 SEVERE OBESITY WITHOUT SERIOUS COMORBIDITY WITH BODY MASS INDEX (BMI) OF 40.0 TO 44.9 IN ADULT, UNSPECIFIED OBESITY TYPE: ICD-10-CM

## 2025-04-16 DIAGNOSIS — Z00.00 ROUTINE GENERAL MEDICAL EXAMINATION AT A HEALTH CARE FACILITY: Primary | ICD-10-CM

## 2025-04-16 DIAGNOSIS — E66.01 CLASS 3 SEVERE OBESITY WITHOUT SERIOUS COMORBIDITY WITH BODY MASS INDEX (BMI) OF 40.0 TO 44.9 IN ADULT, UNSPECIFIED OBESITY TYPE: ICD-10-CM

## 2025-04-16 DIAGNOSIS — G43.109 MIGRAINE WITH AURA AND WITHOUT STATUS MIGRAINOSUS, NOT INTRACTABLE: ICD-10-CM

## 2025-04-16 DIAGNOSIS — Z00.00 HEALTHCARE MAINTENANCE: ICD-10-CM

## 2025-04-16 LAB — POC HEMOGLOBIN A1C: 5.8 % (ref 4.2–6.5)

## 2025-04-16 PROCEDURE — 1036F TOBACCO NON-USER: CPT | Performed by: NURSE PRACTITIONER

## 2025-04-16 PROCEDURE — 83036 HEMOGLOBIN GLYCOSYLATED A1C: CPT | Performed by: NURSE PRACTITIONER

## 2025-04-16 PROCEDURE — 99395 PREV VISIT EST AGE 18-39: CPT | Performed by: NURSE PRACTITIONER

## 2025-04-16 PROCEDURE — 3008F BODY MASS INDEX DOCD: CPT | Performed by: NURSE PRACTITIONER

## 2025-04-16 RX ORDER — SEMAGLUTIDE 0.25 MG/.5ML
0.25 INJECTION, SOLUTION SUBCUTANEOUS WEEKLY
Qty: 2 ML | Refills: 0 | Status: SHIPPED | OUTPATIENT
Start: 2025-04-16 | End: 2025-05-08

## 2025-04-16 ASSESSMENT — ENCOUNTER SYMPTOMS
DIAPHORESIS: 0
GASTROINTESTINAL NEGATIVE: 1
HYPERACTIVE: 0
DIZZINESS: 0
SHORTNESS OF BREATH: 0
CHOKING: 0
SPEECH DIFFICULTY: 0
ADENOPATHY: 0
CONFUSION: 0
ACTIVITY CHANGE: 0
ALLERGIC/IMMUNOLOGIC NEGATIVE: 1
STRIDOR: 0
WEAKNESS: 0
CHILLS: 0
NERVOUS/ANXIOUS: 0
BRUISES/BLEEDS EASILY: 0
DYSPHORIC MOOD: 0
LIGHT-HEADEDNESS: 0
FEVER: 0
CHEST TIGHTNESS: 0
SEIZURES: 0
AGITATION: 0
RESPIRATORY NEGATIVE: 1
APNEA: 0
COUGH: 0
UNEXPECTED WEIGHT CHANGE: 0
FACIAL ASYMMETRY: 0
HEMATOLOGIC/LYMPHATIC NEGATIVE: 1
TREMORS: 0
EYES NEGATIVE: 1
APPETITE CHANGE: 0
HEADACHES: 1
CARDIOVASCULAR NEGATIVE: 1
FATIGUE: 1
NUMBNESS: 0
DECREASED CONCENTRATION: 0
HALLUCINATIONS: 0
WHEEZING: 0
ENDOCRINE NEGATIVE: 1
SLEEP DISTURBANCE: 1
PALPITATIONS: 0
MUSCULOSKELETAL NEGATIVE: 1

## 2025-04-16 ASSESSMENT — PROMIS GLOBAL HEALTH SCALE
CARRYOUT_SOCIAL_ACTIVITIES: VERY GOOD
RATE_SOCIAL_SATISFACTION: VERY GOOD
CARRYOUT_PHYSICAL_ACTIVITIES: COMPLETELY
RATE_QUALITY_OF_LIFE: GOOD
RATE_AVERAGE_PAIN: 1
EMOTIONAL_PROBLEMS: SOMETIMES
RATE_AVERAGE_FATIGUE: MILD
RATE_GENERAL_HEALTH: GOOD
RATE_PHYSICAL_HEALTH: GOOD
RATE_MENTAL_HEALTH: GOOD

## 2025-04-16 NOTE — ASSESSMENT & PLAN NOTE
Try for Wegovy for weight loss through [facility] [employer].  Failed conventional treatment with phentermine.  Cannot take topirimate due to hx of kidney stones.  Could consider compounded if not covered.    No family hx of medullary thyroid carcinoma or MEN.    Semaglutide which works on one hormonal pathway at the gut level.   IMPORTANT Lifestyle Management with the use of anti-obesity medication include:  - Monitor/track your protein intake, ensure at least 80-90g of protein per day for women, 110-120g for men -> this helps to ensure you are consuming adequate protein to maintain/preserve lean body mass in weight loss setting.  - Follow a controlled carbohydrate diet.  Tirzepatide can increase the release of insulin in response to a diet that is high in carbohydrates.  Elevated insulin levels may contribute to weight gain and appetite dysfunction.  A lower carbohydrate diet approach which emphasizes whole food, high fiber carbohydrates is recommended with use.  Specific carbohydrate recommendations can be reviewed with your provider or clinical dietitian.   - Consume at least 64 oz of water per day -> this helps to reduce the risk of constipation/dehydration associated with this medication  - Engage in resistance at least 2x/week - targeting upper & lower body group with each resistance training session -> this helps to ensure you maintain/preserve lean body mass in weight loss setting.     DOSAGE AND ADMINISTRATION:   - Administer Wegovy once weekly, on the same day each week, at any time of day, with or without meals.   - Inject subcutaneously in the abdomen, thigh or upper arm.     CONTRAINDICATIONS  - Personal or family history of medullary thyroid carcinoma or in patients with Multiple Endocrine Neoplasia syndrome type 2.  - Personal history of pancreatitis     WARNINGS AND PRECAUTIONS   Women of childbearing age: Tirzepatide can impact the absorption of oral contraceptives, back up contraception is encouraged or  discussing with your OBGYN or primary care alternative methods of birth control such as IUD or Implant is advised.   - Pancreatitis: Has been reported in clinical trials. Discontinue promptly if pancreatitis is suspected.   - Hypoglycemia with Use of Insulin Secretagogues (Medications that increase your body's production of insulin like Glipizide, Glimepiride, etc) or Insulin: Use of these medications with Tirzepatide may increase the risk of hypoglycemia.  Reducing dose of insulin secretagogue or insulin may be necessary.   - Hypersensitivity Reactions: Hypersensitivity reactions have been reported. Discontinue Tirzepatide if suspected.   - Acute Kidney Injury: Was reported in setting of dehydration due to decreased oral intake of fluids for patients on tirzepatide, monitoring of kidney function will be part of your ongoing care, also severe adverse gastrointestinal reactions are associated with acute kidney injury.     - Severe Gastrointestinal Disease: Use may be associated with gastrointestinal adverse reactions, sometimes severe. Has not been studied in patients with severe gastrointestinal disease and is not recommended in these patients.   - Diabetic Retinopathy Complications in patients with a History of Diabetic Retinopathy:   - Acute Gallbladder Disease: Has occurred in clinical trials. If cholelithiasis is suspected, gallbladder studies and clinical follow-up are indicated.      ADVERSE REACTIONS  - The most common adverse reactions reported in patients treated with Tirzepatide are: nausea, diarrhea, decreased appetite, vomiting, constipation, dyspepsia, and abdominal pain.      *For Constipation--> take a fiber supplement or stool softener daily. Make sure you are drinking at least 64oz of water daily.  *For Nausea--> Eat small, high protein, meals spaced at scheduled intervals throughout the day.   If nausea persists, please call the office.

## 2025-04-16 NOTE — PROGRESS NOTES
Subjective   Arielle Andres is a 35 y.o. female who presents for Annual Exam.  Patient presents for her Annual Physical.  Patient had Pap done at the end of last year.    High schooler 10th grade- has permit eligible this summer,  boy and then  for 4 year old.     Trying to get movement steps and weight lifting in.   GYN at [facility].     A1C done in office today 5.8%      Review of Systems   Constitutional:  Positive for fatigue. Negative for activity change, appetite change, chills, diaphoresis, fever and unexpected weight change.   HENT: Negative.     Eyes: Negative.    Respiratory: Negative.  Negative for apnea, cough, choking, chest tightness, shortness of breath, wheezing and stridor.    Cardiovascular: Negative.  Negative for chest pain, palpitations and leg swelling.   Gastrointestinal: Negative.    Endocrine: Negative.    Genitourinary: Negative.    Musculoskeletal: Negative.    Skin: Negative.    Allergic/Immunologic: Negative.  Negative for environmental allergies, food allergies and immunocompromised state.   Neurological:  Positive for headaches (motrin tylenol will help often doesnt take medicine for it). Negative for dizziness, tremors, seizures, syncope, facial asymmetry, speech difficulty, weakness, light-headedness and numbness.   Hematological: Negative.  Negative for adenopathy. Does not bruise/bleed easily.   Psychiatric/Behavioral:  Positive for sleep disturbance (baby is not consistently sleeping throught the night). Negative for agitation, behavioral problems, confusion, decreased concentration, dysphoric mood, hallucinations, self-injury and suicidal ideas. The patient is not nervous/anxious and is not hyperactive.    All other systems reviewed and are negative.    Objective   Physical Exam  Vitals and nursing note reviewed.   Constitutional:       Appearance: Normal appearance.   HENT:      Head: Normocephalic and atraumatic.      Nose: Nose normal.      Mouth/Throat:     "  Mouth: Mucous membranes are moist.   Eyes:      Pupils: Pupils are equal, round, and reactive to light.   Cardiovascular:      Rate and Rhythm: Normal rate and regular rhythm.      Pulses: Normal pulses.      Heart sounds: Normal heart sounds. No murmur heard.     No friction rub. No gallop.   Pulmonary:      Effort: Pulmonary effort is normal. No respiratory distress.      Breath sounds: Normal breath sounds. No stridor. No wheezing, rhonchi or rales.   Chest:      Chest wall: No tenderness.   Abdominal:      Palpations: Abdomen is soft.   Musculoskeletal:         General: Normal range of motion.      Cervical back: Normal range of motion.   Skin:     General: Skin is warm.      Capillary Refill: Capillary refill takes 2 to 3 seconds.   Neurological:      General: No focal deficit present.      Mental Status: She is alert and oriented to person, place, and time.      Cranial Nerves: Cranial nerves 2-12 are intact.      Sensory: Sensation is intact.      Motor: Motor function is intact.      Deep Tendon Reflexes: Reflexes are normal and symmetric.   Psychiatric:         Mood and Affect: Mood normal.         Behavior: Behavior normal.         Thought Content: Thought content normal.         Judgment: Judgment normal.       /82 (BP Location: Left arm, Patient Position: Sitting, BP Cuff Size: Large adult)   Pulse 80   Temp 36.2 °C (97.2 °F) (Temporal)   Resp 16   Ht 1.6 m (5' 3\")   Wt 108 kg (238 lb)   SpO2 98%   BMI 42.16 kg/m²       Assessment/Plan   Problem List Items Addressed This Visit       Migraine with aura    Class 3 severe obesity without serious comorbidity with body mass index (BMI) of 40.0 to 44.9 in adult    Try for Wegovy for weight loss through [facility].  Failed conventional treatment with phentermine.  Cannot take topirimate due to hx of kidney stones.  Could consider compounded if not covered.    No family hx of medullary thyroid carcinoma or MEN.    Semaglutide which works on " one hormonal pathway at the gut level.   IMPORTANT Lifestyle Management with the use of anti-obesity medication include:  - Monitor/track your protein intake, ensure at least 80-90g of protein per day for women, 110-120g for men -> this helps to ensure you are consuming adequate protein to maintain/preserve lean body mass in weight loss setting.  - Follow a controlled carbohydrate diet.  Tirzepatide can increase the release of insulin in response to a diet that is high in carbohydrates.  Elevated insulin levels may contribute to weight gain and appetite dysfunction.  A lower carbohydrate diet approach which emphasizes whole food, high fiber carbohydrates is recommended with use.  Specific carbohydrate recommendations can be reviewed with your provider or clinical dietitian.   - Consume at least 64 oz of water per day -> this helps to reduce the risk of constipation/dehydration associated with this medication  - Engage in resistance at least 2x/week - targeting upper & lower body group with each resistance training session -> this helps to ensure you maintain/preserve lean body mass in weight loss setting.     DOSAGE AND ADMINISTRATION:   - Administer Wegovy once weekly, on the same day each week, at any time of day, with or without meals.   - Inject subcutaneously in the abdomen, thigh or upper arm.     CONTRAINDICATIONS  - Personal or family history of medullary thyroid carcinoma or in patients with Multiple Endocrine Neoplasia syndrome type 2.  - Personal history of pancreatitis     WARNINGS AND PRECAUTIONS   Women of childbearing age: Tirzepatide can impact the absorption of oral contraceptives, back up contraception is encouraged or discussing with your OBGYN or primary care alternative methods of birth control such as IUD or Implant is advised.   - Pancreatitis: Has been reported in clinical trials. Discontinue promptly if pancreatitis is suspected.   - Hypoglycemia with Use of Insulin Secretagogues  (Medications that increase your body's production of insulin like Glipizide, Glimepiride, etc) or Insulin: Use of these medications with Tirzepatide may increase the risk of hypoglycemia.  Reducing dose of insulin secretagogue or insulin may be necessary.   - Hypersensitivity Reactions: Hypersensitivity reactions have been reported. Discontinue Tirzepatide if suspected.   - Acute Kidney Injury: Was reported in setting of dehydration due to decreased oral intake of fluids for patients on tirzepatide, monitoring of kidney function will be part of your ongoing care, also severe adverse gastrointestinal reactions are associated with acute kidney injury.     - Severe Gastrointestinal Disease: Use may be associated with gastrointestinal adverse reactions, sometimes severe. Has not been studied in patients with severe gastrointestinal disease and is not recommended in these patients.   - Diabetic Retinopathy Complications in patients with a History of Diabetic Retinopathy:   - Acute Gallbladder Disease: Has occurred in clinical trials. If cholelithiasis is suspected, gallbladder studies and clinical follow-up are indicated.      ADVERSE REACTIONS  - The most common adverse reactions reported in patients treated with Tirzepatide are: nausea, diarrhea, decreased appetite, vomiting, constipation, dyspepsia, and abdominal pain.      *For Constipation--> take a fiber supplement or stool softener daily. Make sure you are drinking at least 64oz of water daily.  *For Nausea--> Eat small, high protein, meals spaced at scheduled intervals throughout the day.   If nausea persists, please call the office.             Relevant Medications    semaglutide, weight loss, (Wegovy) 0.25 mg/0.5 mL pen injector    Other Relevant Orders    POCT glycosylated hemoglobin (Hb A1C) manually resulted (Completed)     Other Visit Diagnoses         Routine general medical examination at a health care facility    -  Primary      Healthcare maintenance             We discussed patient does have some insulin resistance and some hyperglycemia when in the hospital so we will check her A1C today. WE are going to work on weight loss had some success with phentermine but weight came back afterwards. The GLP with diet and exercise can help and it is important to reach goals and get BMI down to prevent future diabetes, SEHEBA and heart disease.

## 2025-04-25 DIAGNOSIS — R73.9 HYPERGLYCEMIA: ICD-10-CM

## 2025-04-25 DIAGNOSIS — E66.813 CLASS 3 SEVERE OBESITY WITH SERIOUS COMORBIDITY AND BODY MASS INDEX (BMI) OF 40.0 TO 44.9 IN ADULT, UNSPECIFIED OBESITY TYPE: ICD-10-CM

## 2025-04-25 DIAGNOSIS — R73.03 PREDIABETES: Primary | ICD-10-CM

## 2025-07-29 ENCOUNTER — APPOINTMENT (OUTPATIENT)
Dept: PRIMARY CARE | Facility: CLINIC | Age: 35
End: 2025-07-29
Payer: COMMERCIAL

## 2025-07-29 VITALS
RESPIRATION RATE: 18 BRPM | WEIGHT: 235 LBS | TEMPERATURE: 97.3 F | SYSTOLIC BLOOD PRESSURE: 112 MMHG | DIASTOLIC BLOOD PRESSURE: 70 MMHG | HEIGHT: 63 IN | HEART RATE: 78 BPM | OXYGEN SATURATION: 98 % | BODY MASS INDEX: 41.64 KG/M2

## 2025-07-29 DIAGNOSIS — R23.9 SKIN CHANGE: Primary | ICD-10-CM

## 2025-07-29 PROCEDURE — 3008F BODY MASS INDEX DOCD: CPT | Performed by: NURSE PRACTITIONER

## 2025-07-29 PROCEDURE — 1036F TOBACCO NON-USER: CPT | Performed by: NURSE PRACTITIONER

## 2025-07-29 PROCEDURE — 99214 OFFICE O/P EST MOD 30 MIN: CPT | Performed by: NURSE PRACTITIONER

## 2025-07-29 ASSESSMENT — ENCOUNTER SYMPTOMS
CARDIOVASCULAR NEGATIVE: 1
JOINT SWELLING: 0
GASTROINTESTINAL NEGATIVE: 1
BACK PAIN: 0
COLOR CHANGE: 1

## 2025-07-29 NOTE — PROGRESS NOTES
Subjective   Arielle Andres is a 35 y.o. female here for discussion regarding weight loss.  GLP is curbing appetite no side effect. Clothes are not fitting any better. She has noted a weight gain of approximately >30 pounds over the last a few years. She feels ideal weight is 180 pounds or somewhere around there . Weight at graduation from high school was 130-140 pounds. History of eating disorders: none. There is a family history positive for obesity in the patient. Previous treatments for obesity include increase protein intake, strength training . Obesity associated medical conditions: none. Obesity associated medications: none and phentermine did well previously on it. Cardiovascular risk factors besides obesity: none.    Breakfast- Yogurt  Snack- none  Lunch- leftovers, snack items -fruit crackers   Dinner-chicken protein    Walking movement time on the water.    Had inconclusive discoid biopsy rash; over right hip flare with sun exposure every time.     Review of Systems   Constitutional:  Unexpected weight change: neutral.   Cardiovascular: Negative.    Gastrointestinal: Negative.    Musculoskeletal:  Negative for back pain, gait problem and joint swelling.   Skin:  Positive for color change (over right lateral hip with sun exposure develops discoid erythema that is painful).       Objective   Body mass index is 41.63 kg/m².  Physical Exam  Vitals reviewed.   HENT:      Head: Normocephalic.     Cardiovascular:      Rate and Rhythm: Normal rate and regular rhythm.      Pulses: Normal pulses.      Heart sounds: Normal heart sounds. No murmur heard.     No friction rub. No gallop.   Pulmonary:      Effort: Pulmonary effort is normal. No respiratory distress.      Breath sounds: Normal breath sounds. No stridor. No wheezing, rhonchi or rales.   Chest:      Chest wall: No tenderness.     Skin:     Comments: Patient has 3-4 convalescing discoid macules that develop in sun exposure that come and go demonstrated [place]   picture      Neurological:      General: No focal deficit present.      Mental Status: She is alert and oriented to person, place, and time. Mental status is at baseline.     Psychiatric:         Mood and Affect: Mood normal.         Behavior: Behavior normal.         Thought Content: Thought content normal.         Judgment: Judgment normal.          Assessment/Plan   Obesity. I assessed Arielle to be in an action stage with respect to weight loss.  1. Skin change  Referral to Dermatology    ANCA-Associated Vasculitis Profile (ANCA,MPO,PR3)    KIRK with Reflex to JEFFERY    Citrulline Antibody, IgG    Rheumatoid factor    C-reactive protein    ANCA-Associated Vasculitis Profile (ANCA,MPO,PR3)    KIRK with Reflex to JEFFERY    Citrulline Antibody, IgG    Rheumatoid factor    C-reactive protein          1. 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 Emerging therapies are calcipotriol, topical DNA repair enzymes.       Make sure you are taking an MVI while on the GLP      Semaglutide which works on one hormonal pathway at the gut level. Tirzepatide is preferred as it works on two hormonal pathways at the gut level, making it a bit more effective! It is still a  once weekly injection. Medication resource below and also visit [employer] to review additional med info. I will send the medication to [facility]. Please visit their website to set up a cash pay account. They ship the vial from their [place] pharmacy so it usually arrives quickly once payment is made. Please let me know if you have additional questions.   IMPORTANT Lifestyle Management with the use of anti-obesity medication include:  - Monitor/track your protein intake, ensure at least 80-90g of protein per day for women, 110-120g for men -> this helps to ensure you are consuming adequate protein to maintain/preserve lean body mass in weight loss setting.  - Follow a controlled carbohydrate diet.  Tirzepatide can increase the release of insulin in response to a diet that is high in carbohydrates.  Elevated insulin levels may contribute to weight gain and appetite dysfunction.  A lower carbohydrate diet approach which emphasizes whole food, high fiber carbohydrates is recommended with use.  Specific carbohydrate recommendations can be reviewed with your provider or clinical dietitian.   - Consume at least 64 oz of water per day -> this helps to reduce the risk of constipation/dehydration associated with this medication  - Engage in resistance at least 2x/week - targeting upper & lower body group with each resistance training session -> this helps to ensure you maintain/preserve lean body mass in weight loss setting.     DOSAGE AND ADMINISTRATION:   - Administer Tirzepatide once weekly, on the same day each week, at any time of day, with or without meals.   - Inject subcutaneously in the abdomen, thigh or upper arm.  - The recommended starting dosage is 2.5 mg injected subcutaneously once weekly x 4 weeks, further dose increases are determined based on your clinical response of glycemic control or weight reduction, depending on indication for use.  - The maximum dosage is 15 mg subcutaneously once weekly.      CONTRAINDICATIONS  - Personal or family history of medullary thyroid carcinoma or in patients with Multiple Endocrine Neoplasia syndrome type 2.  - Personal history of pancreatitis     WARNINGS AND PRECAUTIONS   Women of childbearing age: Tirzepatide can impact the absorption of oral contraceptives, back up contraception is encouraged or discussing with your OBGYN or primary care alternative methods of birth control such as IUD or Implant is advised.   - Pancreatitis: Has been reported in clinical trials. Discontinue promptly if pancreatitis is suspected.   - Hypoglycemia with Use of Insulin Secretagogues (Medications that increase your body's production of insulin like Glipizide, Glimepiride, etc) or Insulin: Use of these medications with Tirzepatide may increase the risk of hypoglycemia.  Reducing dose of insulin secretagogue or insulin may be necessary.   - Hypersensitivity Reactions: Hypersensitivity reactions have been reported. Discontinue Tirzepatide if suspected.   - Acute Kidney Injury: Was reported in setting of dehydration due to decreased oral intake of fluids for patients on tirzepatide, monitoring of kidney function will be part of your ongoing care, also severe adverse gastrointestinal reactions are associated with acute kidney injury.     - Severe Gastrointestinal Disease: Use may be associated with gastrointestinal adverse reactions, sometimes severe. Has not been studied in patients with severe gastrointestinal disease and is not recommended in these patients.   - Diabetic Retinopathy Complications in patients with a History of Diabetic Retinopathy:   - Acute Gallbladder Disease: Has occurred in clinical trials. If cholelithiasis is suspected, gallbladder studies and clinical follow-up are indicated.      ADVERSE REACTIONS  - The most common adverse reactions reported in patients treated with Tirzepatide are: nausea, diarrhea, decreased appetite, vomiting, constipation, dyspepsia, and abdominal  pain.      *For Constipation--> take a fiber supplement or stool softener daily. Make sure you are drinking at least 64oz of water daily.  *For Nausea--> Eat small, high protein, meals spaced at scheduled intervals throughout the day.   If nausea persists, please call the office.       Fall Risk

## 2025-07-29 NOTE — PATIENT INSTRUCTIONS
Breakfast- egg bites + yogurt- renata seeds if possible   Snack- Fairlife chocolate milk 20 mg, cottage cheese  Prep some chicken or tuna salad  Ground beef bowls chicken bowls  Meal service   Protein balls.     The daily ten colt rosales.

## 2025-07-30 NOTE — ASSESSMENT & PLAN NOTE
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 Emerging therapies are calcipotriol, topical DNA repair enzymes.    Cutaneous lupus is possible but less likely suspected due to lack of other inflammatory symptoms.

## 2025-08-30 LAB
ANA SER QL IF: NEGATIVE
ANCA AB SER QL: NEGATIVE
CCP IGG SERPL-ACNC: <16 UNITS
CRP SERPL-MCNC: 5.6 MG/L
MYELOPEROXIDASE AB SER IA-ACNC: <1 AI
PROTEINASE3 AB SER IA-ACNC: <1 AI
RHEUMATOID FACT SERPL-ACNC: <10 IU/ML

## 2025-11-21 ENCOUNTER — APPOINTMENT (OUTPATIENT)
Dept: PRIMARY CARE | Facility: CLINIC | Age: 35
End: 2025-11-21
Payer: COMMERCIAL

## 2026-02-09 ENCOUNTER — APPOINTMENT (OUTPATIENT)
Dept: DERMATOLOGY | Facility: CLINIC | Age: 36
End: 2026-02-09
Payer: COMMERCIAL